# Patient Record
Sex: FEMALE | Race: WHITE | NOT HISPANIC OR LATINO | URBAN - METROPOLITAN AREA
[De-identification: names, ages, dates, MRNs, and addresses within clinical notes are randomized per-mention and may not be internally consistent; named-entity substitution may affect disease eponyms.]

---

## 2020-09-08 ENCOUNTER — OFFICE (OUTPATIENT)
Dept: URBAN - METROPOLITAN AREA CLINIC 64 | Facility: CLINIC | Age: 35
End: 2020-09-08

## 2020-09-08 VITALS
HEART RATE: 78 BPM | HEIGHT: 67 IN | DIASTOLIC BLOOD PRESSURE: 86 MMHG | SYSTOLIC BLOOD PRESSURE: 131 MMHG | WEIGHT: 169 LBS

## 2020-09-08 DIAGNOSIS — K64.5 PERIANAL VENOUS THROMBOSIS: ICD-10-CM

## 2020-09-08 DIAGNOSIS — K62.5 HEMORRHAGE OF ANUS AND RECTUM: ICD-10-CM

## 2020-09-08 PROCEDURE — 99203 OFFICE O/P NEW LOW 30 MIN: CPT | Performed by: NURSE PRACTITIONER

## 2020-09-09 ENCOUNTER — OFFICE VISIT (OUTPATIENT)
Dept: SURGERY | Facility: CLINIC | Age: 35
End: 2020-09-09

## 2020-09-09 VITALS
BODY MASS INDEX: 26.53 KG/M2 | TEMPERATURE: 97.4 F | HEART RATE: 102 BPM | DIASTOLIC BLOOD PRESSURE: 73 MMHG | OXYGEN SATURATION: 98 % | HEIGHT: 67 IN | WEIGHT: 169 LBS | SYSTOLIC BLOOD PRESSURE: 106 MMHG

## 2020-09-09 DIAGNOSIS — K64.5 THROMBOSED HEMORRHOIDS: Primary | ICD-10-CM

## 2020-09-09 PROBLEM — M25.562 KNEE PAIN, LEFT: Status: ACTIVE | Noted: 2017-12-26

## 2020-09-09 PROBLEM — F17.200 SMOKER: Status: ACTIVE | Noted: 2017-01-03

## 2020-09-09 PROBLEM — R01.1 MURMUR: Status: ACTIVE | Noted: 2020-09-09

## 2020-09-09 PROCEDURE — 46083 INC THROMBOSED HROID XTRNL: CPT | Performed by: SURGERY

## 2020-09-09 PROCEDURE — 99214 OFFICE O/P EST MOD 30 MIN: CPT | Performed by: SURGERY

## 2020-09-09 RX ORDER — DEXTROAMPHETAMINE SACCHARATE, AMPHETAMINE ASPARTATE, DEXTROAMPHETAMINE SULFATE AND AMPHETAMINE SULFATE 2.5; 2.5; 2.5; 2.5 MG/1; MG/1; MG/1; MG/1
20 TABLET ORAL DAILY
COMMUNITY
Start: 2020-09-01 | End: 2022-01-13

## 2020-09-09 RX ORDER — VARENICLINE TARTRATE 1 MG/1
1 TABLET, FILM COATED ORAL 2 TIMES DAILY
COMMUNITY
End: 2022-12-13

## 2020-09-09 RX ORDER — HYDROCODONE BITARTRATE AND ACETAMINOPHEN 7.5; 325 MG/1; MG/1
1 TABLET ORAL 4 TIMES DAILY PRN
COMMUNITY
Start: 2020-09-01

## 2020-09-09 RX ORDER — DEXTROAMPHETAMINE SULFATE, DEXTROAMPHETAMINE SACCHARATE, AMPHETAMINE SULFATE AND AMPHETAMINE ASPARTATE 5; 5; 5; 5 MG/1; MG/1; MG/1; MG/1
20 CAPSULE, EXTENDED RELEASE ORAL EVERY MORNING
COMMUNITY
Start: 2020-09-01 | End: 2021-11-29

## 2020-09-09 RX ORDER — ONDANSETRON 4 MG/1
4 TABLET, FILM COATED ORAL DAILY PRN
COMMUNITY
Start: 2020-09-02

## 2020-09-09 NOTE — PROGRESS NOTES
GENERAL SURGERY CONSULTATION NOTE    Consult requested by: ANSON Campbell    Patient Care Team:  Trell Pal MD as PCP - General    Reason for consult: Thrombosed external hemorrhoid    Subjective     Patient is a 35 y.o. female presents with a thrombosed external hemorrhoid after being seen by gastroenterology yesterday.  Patient states that she has had hemorrhoids since she had her first child over 13 years ago.  Occasionally they will flare, which point she is able to use Preparation H suppositories in the pain and swelling get better.  About 3 weeks ago she developed a lump in her anal canal which was exceptionally painful.  She began applying Preparation H to the area and performing Epsom salt sitz bath's which did not alleviate her symptoms.  Yesterday she was seen by her gastroenterologist who prescribed too bad ointment which she has been taking but it has not made much of a difference.  She was noted to have a thrombosed external hemorrhoid and referred to me for management.  Patient reports that she usually is pretty regular having a bowel movement every morning.  She states that her bowel movements are solid, but she does strain occasionally.  With the passage of bowel movements she does have some anal pain, as well as bleeding with wiping.  When she is not having bowel movements she does have occasional burning secondary to her hemorrhoids.    Review of Systems   Constitutional: Negative for appetite change, chills and fever.   HENT: Negative for congestion and sore throat.    Respiratory: Negative for cough and shortness of breath.    Cardiovascular: Negative for chest pain and palpitations.   Gastrointestinal: Positive for anal bleeding, constipation and rectal pain. Negative for abdominal pain, diarrhea, nausea, vomiting and GERD.   Genitourinary: Negative for difficulty urinating, dysuria and frequency.   Musculoskeletal: Negative for arthralgias and back pain.   Skin: Negative for rash and skin  lesions.   Neurological: Negative for dizziness, seizures and memory problem.   Hematological: Negative for adenopathy. Does not bruise/bleed easily.   Psychiatric/Behavioral: Negative for sleep disturbance and depressed mood.        History  Past Medical History:   Diagnosis Date   • Hemorrhoid      Past Surgical History:   Procedure Laterality Date   •  SECTION     •  SECTION       History reviewed. No pertinent family history.  Social History     Tobacco Use   • Smoking status: Current Every Day Smoker   • Smokeless tobacco: Never Used   Substance Use Topics   • Alcohol use: Yes   • Drug use: Never       (Not in a hospital admission)  Allergies:  Adhesive tape    Objective     Vital Signs  Temp:  [97.4 °F (36.3 °C)] 97.4 °F (36.3 °C)  Heart Rate:  [102] 102  BP: (106)/(73) 106/73    Physical Exam   Constitutional: She is oriented to person, place, and time. She appears well-developed and well-nourished.   HENT:   Head: Normocephalic and atraumatic.   Eyes: Pupils are equal, round, and reactive to light.   Neck: Normal range of motion.   Cardiovascular: Normal rate and regular rhythm.   Pulmonary/Chest: Effort normal and breath sounds normal.   Abdominal: Soft. She exhibits no distension. There is no tenderness. No hernia.   Genitourinary:   Genitourinary Comments: Patient has a large external hemorrhoid in the right posterior position.  It is swollen, tender, and tense.  Internal digital rectal exam revealed no masses, bleeding, or significant internal hemorrhoids.  Patient does have other external hemorrhoids which are enlarged, but do not appear to be thrombosed at this time.   Musculoskeletal: Normal range of motion. She exhibits no edema.   Lymphadenopathy:     She has no cervical adenopathy.     She has no axillary adenopathy.   Neurological: She is alert and oriented to person, place, and time.   Skin: Skin is warm and dry. No rash noted.   Psychiatric: She has a normal mood and  affect.   Vitals reviewed.      Results Review:   Lab Results (last 24 hours)     ** No results found for the last 24 hours. **        No radiology results for the last day      I reviewed the patient's new imaging results and agree with the interpretation.  I reviewed the patient's other test results and agree with the interpretation    Assessment/Plan     Active Problems:  Thrombosed external hemorrhoid  Hemorrhoids    We reviewed the natural history of hemorrhoids, as well as medical and surgical management options available for them.    In order to give her immediate relief with regards to her thrombosed external hemorrhoid, I recommend incision and drainage at this time.  The risk, benefits, and alternatives to this were discussed the patient agreed to proceed with allowing me to perform this procedure at the bedside within the office.  For description of procedure, please see other note.    The first goal is to improve bowel habits by increasing fiber in the diet daily.  I recommend taking 2 scoops of fiber (either Metamucil or Benefiber) nightly dissolved in 8 ounces of water.  This will bulk and soften stools and make them easier to pass.    I then recommend drinking between 64 and 100 ounces of water per day.  We discussed that if the fiber is increased without increasing water, this may actually exacerbate constipation and allow for firm bowel movements.    I recommend performing sitz bath's 3 times daily and as needed after bowel movements.    We discussed using a product such as Anusol and Preparation H for short periods of time to treat exacerbations.    The patient will attempt these maneuvers in an attempt to optimally medically manage her hemorrhoids.  If this is unsuccessful, in a month we will revisit and discuss surgical approach for hemorrhoids.    I discussed the patients findings and my recommendations with the patient who agrees.     Trell Jeffery MD  09/09/20  13:44

## 2020-09-09 NOTE — PROGRESS NOTES
Operative Note    Patient Name:  Antonia Estrella  YOB: 1985    Date of Surgery: 9/9/2020       Indications:  See consult note. Thrombosed external hemorrhoid    Pre-op Diagnosis:   Thrombosed external hemorrhoid    Post-op Diagnosis:  Post-Op Diagnosis Codes:  Same    Procedure/CPT® Codes: Incision and drainage of thrombosed external hemorrhoid    Staff:  MD Jose D-surgeon    Anesthesia: 1% lidocaine with epinephrine    Estimated Blood Loss: minimal    Implants:    none    Specimen:          None    Findings: Blood clot within the thrombosed external hemorrhoid    Complications: None, immediately    Description of Procedure: After obtaining verbal consent, the patient was placed in the fetal position.  Her anus was then prepped and draped in the usual sterile fashion using Betadine.  I then injected local anesthesia into her thrombosed external hemorrhoid and made a linear incision using an 11 blade along the hemorrhoid in a radial fashion.  Once this was done I was able to express a large amount of coagulated and liquefied blood from the cavity of the thrombosed external hemorrhoid.  At the conclusion of this the hemorrhoid was much smaller, and no longer tender.  Patient seemed to have almost immediate relief with her pain at that time.  Hemostasis was verified and the patient was allowed to leave under her own power.  She tolerated the procedure well.    Trell Jeffery MD     Date: 9/9/2020  Time: 13:49

## 2020-10-26 ENCOUNTER — OFFICE VISIT (OUTPATIENT)
Dept: SURGERY | Facility: CLINIC | Age: 35
End: 2020-10-26

## 2020-10-26 VITALS
RESPIRATION RATE: 16 BRPM | HEART RATE: 85 BPM | TEMPERATURE: 97.1 F | HEIGHT: 67 IN | WEIGHT: 169.4 LBS | SYSTOLIC BLOOD PRESSURE: 113 MMHG | BODY MASS INDEX: 26.59 KG/M2 | DIASTOLIC BLOOD PRESSURE: 77 MMHG | OXYGEN SATURATION: 100 %

## 2020-10-26 DIAGNOSIS — K64.4 RESIDUAL HEMORRHOIDAL SKIN TAGS: Primary | ICD-10-CM

## 2020-10-26 PROCEDURE — 99213 OFFICE O/P EST LOW 20 MIN: CPT | Performed by: SURGERY

## 2020-10-26 NOTE — PROGRESS NOTES
GENERAL SURGERY ESTABLISHED PATIENT NOTE    Consult requested by: ANSON Campbell    Patient Care Team:  Trell Pal MD as PCP - General    Reason for consult: Thrombosed external hemorrhoid    Subjective   From 9/9/2020:  Patient is a 35 y.o. female presents with a thrombosed external hemorrhoid after being seen by gastroenterology yesterday.  Patient states that she has had hemorrhoids since she had her first child over 13 years ago.  Occasionally they will flare, which point she is able to use Preparation H suppositories in the pain and swelling get better.  About 3 weeks ago she developed a lump in her anal canal which was exceptionally painful.  She began applying Preparation H to the area and performing Epsom salt sitz bath's which did not alleviate her symptoms.  Yesterday she was seen by her gastroenterologist who prescribed too bad ointment which she has been taking but it has not made much of a difference.  She was noted to have a thrombosed external hemorrhoid and referred to me for management.  Patient reports that she usually is pretty regular having a bowel movement every morning.  She states that her bowel movements are solid, but she does strain occasionally.  With the passage of bowel movements she does have some anal pain, as well as bleeding with wiping.  When she is not having bowel movements she does have occasional burning secondary to her hemorrhoids.    From today:  The patient reports improvement in her overall condition and is no longer having any bleeding.  The rectal pain which she was once experiencing with the thrombosed external hemorrhoid has resolved.  She did state that she had some discomfort for 3 to 4 days following incision and drainage which was a sharp stinging pain.  Since that time she has attempted to increase her fiber intake, and has increased her overall water intake.  She is no longer drinking sodas but now drinks tea and lemonade and water primarily.  She does state  that her bowel movements are still erratic often alternating between diarrhea and constipation.  She notices that when her bowel movements are irregular she tends to have discomfort.    Review of Systems   Constitutional: Negative for appetite change, chills and fever.   HENT: Negative for congestion and sore throat.    Respiratory: Negative for cough and shortness of breath.    Cardiovascular: Negative for chest pain and palpitations.   Gastrointestinal: Positive for constipation and diarrhea. Negative for abdominal pain, anal bleeding, nausea, rectal pain, vomiting and GERD.   Genitourinary: Negative for difficulty urinating, dysuria and frequency.   Musculoskeletal: Negative for arthralgias and back pain.   Skin: Negative for rash and skin lesions.   Neurological: Negative for dizziness, seizures and memory problem.   Hematological: Negative for adenopathy. Does not bruise/bleed easily.   Psychiatric/Behavioral: Negative for sleep disturbance and depressed mood.        History  Past Medical History:   Diagnosis Date   • Hemorrhoid      Past Surgical History:   Procedure Laterality Date   •  SECTION     •  SECTION       No family history on file.  Social History     Tobacco Use   • Smoking status: Current Every Day Smoker   • Smokeless tobacco: Never Used   Substance Use Topics   • Alcohol use: Yes   • Drug use: Never     (Not in a hospital admission)    Allergies:  Adhesive tape    Objective     Vital Signs  Temp:  [97.1 °F (36.2 °C)] 97.1 °F (36.2 °C)  Heart Rate:  [85] 85  Resp:  [16] 16  BP: (113)/(77) 113/77    Physical Exam   Constitutional: She is oriented to person, place, and time. She appears well-developed.   HENT:   Head: Normocephalic and atraumatic.   Eyes: Pupils are equal, round, and reactive to light.   Neck: Normal range of motion.   Cardiovascular: Normal rate and regular rhythm.   Pulmonary/Chest: Effort normal and breath sounds normal.   Abdominal: Soft. She exhibits no  distension. There is no abdominal tenderness. No hernia.   Genitourinary:    Genitourinary Comments: The patient does have bulky residual external hemorrhoidal skin tags which are not inflamed and do not demonstrate any evidence of ongoing thrombosis.  Internal digital rectal exam performed, no masses, no evidence of bleeding, and small amount of stool in the rectal vault.     Musculoskeletal: Normal range of motion.   Lymphadenopathy:     She has no cervical adenopathy.   Neurological: She is alert and oriented to person, place, and time.   Skin: Skin is warm and dry. No rash noted.   Vitals reviewed.      Results Review:   Lab Results (last 24 hours)     ** No results found for the last 24 hours. **        No radiology results for the last day      I reviewed the patient's new imaging results and agree with the interpretation.  I reviewed the patient's other test results and agree with the interpretation    Assessment/Plan     Active Problems:  History of thrombosed external hemorrhoid  Hemorrhoids    Discussed with patient that she seems to be doing well from the standpoint of her external hemorrhoids.  There is no evidence of any ongoing thrombosis.  Her symptoms have been well regulated with increase in water intake and fiber intake.  With regards to surgical excision, she does have bulky external hemorrhoidal skin tags which could be removed.  We discussed that surgery for this would be painful as these lie external to the dentate line, and the patient does not want to undergo such a painful operation.  She will continue to increase her water and fiber intake as well as performing sitz bath's.  She will continue to use a bidet in order to maintain cleanliness of her anal area.  Should the patient wish to undergo surgery in the future or have symptoms of recurrent thrombosed external hemorrhoids she states that she will return to my office and discuss this with me further.  Follow-up with me as needed    I  discussed the patients findings and my recommendations with the patient who agrees.     Trell Jeffery MD  10/26/20  13:00 EDT

## 2020-11-19 ENCOUNTER — LAB (OUTPATIENT)
Dept: LAB | Facility: HOSPITAL | Age: 35
End: 2020-11-19

## 2020-11-19 ENCOUNTER — TRANSCRIBE ORDERS (OUTPATIENT)
Dept: ADMINISTRATIVE | Facility: HOSPITAL | Age: 35
End: 2020-11-19

## 2020-11-19 DIAGNOSIS — Z01.818 PREOP EXAMINATION: ICD-10-CM

## 2020-11-19 DIAGNOSIS — Z01.818 PREOP EXAMINATION: Primary | ICD-10-CM

## 2020-11-19 LAB
ALBUMIN SERPL-MCNC: 4.4 G/DL (ref 3.5–5.2)
ANION GAP SERPL CALCULATED.3IONS-SCNC: 12.6 MMOL/L (ref 5–15)
BASOPHILS # BLD AUTO: 0.03 10*3/MM3 (ref 0–0.2)
BASOPHILS NFR BLD AUTO: 0.4 % (ref 0–1.5)
BUN SERPL-MCNC: 8 MG/DL (ref 6–20)
BUN/CREAT SERPL: 10.4 (ref 7–25)
CALCIUM SPEC-SCNC: 9 MG/DL (ref 8.6–10.5)
CHLORIDE SERPL-SCNC: 104 MMOL/L (ref 98–107)
CO2 SERPL-SCNC: 24.4 MMOL/L (ref 22–29)
CREAT SERPL-MCNC: 0.77 MG/DL (ref 0.57–1)
DEPRECATED RDW RBC AUTO: 40.6 FL (ref 37–54)
EOSINOPHIL # BLD AUTO: 0.05 10*3/MM3 (ref 0–0.4)
EOSINOPHIL NFR BLD AUTO: 0.6 % (ref 0.3–6.2)
ERYTHROCYTE [DISTWIDTH] IN BLOOD BY AUTOMATED COUNT: 12 % (ref 12.3–15.4)
GFR SERPL CREATININE-BSD FRML MDRD: 84 ML/MIN/1.73
GLUCOSE SERPL-MCNC: 144 MG/DL (ref 65–99)
HBA1C MFR BLD: 5.4 % (ref 3.5–5.6)
HCT VFR BLD AUTO: 42.3 % (ref 34–46.6)
HGB BLD-MCNC: 14.3 G/DL (ref 12–15.9)
IMM GRANULOCYTES # BLD AUTO: 0.04 10*3/MM3 (ref 0–0.05)
IMM GRANULOCYTES NFR BLD AUTO: 0.5 % (ref 0–0.5)
LYMPHOCYTES # BLD AUTO: 2.2 10*3/MM3 (ref 0.7–3.1)
LYMPHOCYTES NFR BLD AUTO: 27.8 % (ref 19.6–45.3)
MCH RBC QN AUTO: 31.4 PG (ref 26.6–33)
MCHC RBC AUTO-ENTMCNC: 33.8 G/DL (ref 31.5–35.7)
MCV RBC AUTO: 93 FL (ref 79–97)
MONOCYTES # BLD AUTO: 0.5 10*3/MM3 (ref 0.1–0.9)
MONOCYTES NFR BLD AUTO: 6.3 % (ref 5–12)
NEUTROPHILS NFR BLD AUTO: 5.1 10*3/MM3 (ref 1.7–7)
NEUTROPHILS NFR BLD AUTO: 64.4 % (ref 42.7–76)
NRBC BLD AUTO-RTO: 0 /100 WBC (ref 0–0.2)
PLATELET # BLD AUTO: 356 10*3/MM3 (ref 140–450)
PMV BLD AUTO: 9.8 FL (ref 6–12)
POTASSIUM SERPL-SCNC: 4.3 MMOL/L (ref 3.5–5.2)
RBC # BLD AUTO: 4.55 10*6/MM3 (ref 3.77–5.28)
SODIUM SERPL-SCNC: 141 MMOL/L (ref 136–145)
WBC # BLD AUTO: 7.92 10*3/MM3 (ref 3.4–10.8)

## 2020-11-19 PROCEDURE — C9803 HOPD COVID-19 SPEC COLLECT: HCPCS

## 2020-11-19 PROCEDURE — U0004 COV-19 TEST NON-CDC HGH THRU: HCPCS

## 2020-11-19 PROCEDURE — 85025 COMPLETE CBC W/AUTO DIFF WBC: CPT

## 2020-11-19 PROCEDURE — 36415 COLL VENOUS BLD VENIPUNCTURE: CPT

## 2020-11-19 PROCEDURE — 82040 ASSAY OF SERUM ALBUMIN: CPT

## 2020-11-19 PROCEDURE — 83036 HEMOGLOBIN GLYCOSYLATED A1C: CPT

## 2020-11-19 PROCEDURE — 80048 BASIC METABOLIC PNL TOTAL CA: CPT

## 2020-11-20 LAB — SARS-COV-2 RNA RESP QL NAA+PROBE: NOT DETECTED

## 2021-02-01 ENCOUNTER — HOSPITAL ENCOUNTER (OUTPATIENT)
Dept: CARDIOLOGY | Facility: HOSPITAL | Age: 36
Discharge: HOME | End: 2021-02-01

## 2021-02-01 ENCOUNTER — TRANSCRIBE ORDERS (OUTPATIENT)
Dept: ADMINISTRATIVE | Facility: HOSPITAL | Age: 36
End: 2021-02-01

## 2021-02-01 ENCOUNTER — LAB (OUTPATIENT)
Dept: LAB | Facility: HOSPITAL | Age: 36
End: 2021-02-01

## 2021-02-01 DIAGNOSIS — Z01.818 OTHER SPECIFIED PRE-OPERATIVE EXAMINATION: Primary | ICD-10-CM

## 2021-02-01 DIAGNOSIS — Z01.818 OTHER SPECIFIED PRE-OPERATIVE EXAMINATION: ICD-10-CM

## 2021-02-01 LAB
ANION GAP SERPL CALCULATED.3IONS-SCNC: 8.5 MMOL/L (ref 5–15)
BASOPHILS # BLD AUTO: 0.04 10*3/MM3 (ref 0–0.2)
BASOPHILS NFR BLD AUTO: 0.5 % (ref 0–1.5)
BUN SERPL-MCNC: 5 MG/DL (ref 6–20)
BUN/CREAT SERPL: 7 (ref 7–25)
CALCIUM SPEC-SCNC: 9.1 MG/DL (ref 8.6–10.5)
CHLORIDE SERPL-SCNC: 104 MMOL/L (ref 98–107)
CO2 SERPL-SCNC: 25.5 MMOL/L (ref 22–29)
CREAT SERPL-MCNC: 0.71 MG/DL (ref 0.57–1)
DEPRECATED RDW RBC AUTO: 43.5 FL (ref 37–54)
EOSINOPHIL # BLD AUTO: 0.1 10*3/MM3 (ref 0–0.4)
EOSINOPHIL NFR BLD AUTO: 1.3 % (ref 0.3–6.2)
ERYTHROCYTE [DISTWIDTH] IN BLOOD BY AUTOMATED COUNT: 12.4 % (ref 12.3–15.4)
GFR SERPL CREATININE-BSD FRML MDRD: 93 ML/MIN/1.73
GLUCOSE SERPL-MCNC: 116 MG/DL (ref 65–99)
HBA1C MFR BLD: 5.4 % (ref 3.5–5.6)
HCT VFR BLD AUTO: 44 % (ref 34–46.6)
HGB BLD-MCNC: 14.7 G/DL (ref 12–15.9)
IMM GRANULOCYTES # BLD AUTO: 0.02 10*3/MM3 (ref 0–0.05)
IMM GRANULOCYTES NFR BLD AUTO: 0.3 % (ref 0–0.5)
LYMPHOCYTES # BLD AUTO: 2.35 10*3/MM3 (ref 0.7–3.1)
LYMPHOCYTES NFR BLD AUTO: 30.1 % (ref 19.6–45.3)
MCH RBC QN AUTO: 32 PG (ref 26.6–33)
MCHC RBC AUTO-ENTMCNC: 33.4 G/DL (ref 31.5–35.7)
MCV RBC AUTO: 95.9 FL (ref 79–97)
MONOCYTES # BLD AUTO: 0.49 10*3/MM3 (ref 0.1–0.9)
MONOCYTES NFR BLD AUTO: 6.3 % (ref 5–12)
MRSA DNA SPEC QL NAA+PROBE: NORMAL
NEUTROPHILS NFR BLD AUTO: 4.81 10*3/MM3 (ref 1.7–7)
NEUTROPHILS NFR BLD AUTO: 61.5 % (ref 42.7–76)
NRBC BLD AUTO-RTO: 0 /100 WBC (ref 0–0.2)
PLATELET # BLD AUTO: 345 10*3/MM3 (ref 140–450)
PMV BLD AUTO: 9.8 FL (ref 6–12)
POTASSIUM SERPL-SCNC: 4.1 MMOL/L (ref 3.5–5.2)
RBC # BLD AUTO: 4.59 10*6/MM3 (ref 3.77–5.28)
SODIUM SERPL-SCNC: 138 MMOL/L (ref 136–145)
WBC # BLD AUTO: 7.81 10*3/MM3 (ref 3.4–10.8)

## 2021-02-01 PROCEDURE — 85025 COMPLETE CBC W/AUTO DIFF WBC: CPT

## 2021-02-01 PROCEDURE — 87641 MR-STAPH DNA AMP PROBE: CPT

## 2021-02-01 PROCEDURE — 93005 ELECTROCARDIOGRAM TRACING: CPT

## 2021-02-01 PROCEDURE — 83036 HEMOGLOBIN GLYCOSYLATED A1C: CPT

## 2021-02-01 PROCEDURE — 80048 BASIC METABOLIC PNL TOTAL CA: CPT

## 2021-02-01 PROCEDURE — 93010 ELECTROCARDIOGRAM REPORT: CPT | Performed by: INTERNAL MEDICINE

## 2021-02-01 PROCEDURE — 36415 COLL VENOUS BLD VENIPUNCTURE: CPT

## 2021-02-04 ENCOUNTER — LAB (OUTPATIENT)
Dept: LAB | Facility: HOSPITAL | Age: 36
End: 2021-02-04

## 2021-02-04 DIAGNOSIS — Z01.818 OTHER SPECIFIED PRE-OPERATIVE EXAMINATION: ICD-10-CM

## 2021-02-04 LAB — SARS-COV-2 ORF1AB RESP QL NAA+PROBE: NOT DETECTED

## 2021-02-04 PROCEDURE — U0004 COV-19 TEST NON-CDC HGH THRU: HCPCS

## 2021-02-04 PROCEDURE — C9803 HOPD COVID-19 SPEC COLLECT: HCPCS

## 2021-02-12 LAB — QT INTERVAL: 381 MS

## 2021-10-20 ENCOUNTER — APPOINTMENT (OUTPATIENT)
Dept: GENERAL RADIOLOGY | Facility: HOSPITAL | Age: 36
End: 2021-10-20

## 2021-10-20 ENCOUNTER — HOSPITAL ENCOUNTER (EMERGENCY)
Facility: HOSPITAL | Age: 36
Discharge: HOME | End: 2021-10-20
Admitting: EMERGENCY MEDICINE

## 2021-10-20 VITALS
HEART RATE: 83 BPM | WEIGHT: 170 LBS | OXYGEN SATURATION: 99 % | DIASTOLIC BLOOD PRESSURE: 75 MMHG | BODY MASS INDEX: 26.68 KG/M2 | HEIGHT: 67 IN | RESPIRATION RATE: 16 BRPM | TEMPERATURE: 97.5 F | SYSTOLIC BLOOD PRESSURE: 125 MMHG

## 2021-10-20 DIAGNOSIS — M79.672 LEFT FOOT PAIN: Primary | ICD-10-CM

## 2021-10-20 DIAGNOSIS — M25.572 LEFT ANKLE PAIN, UNSPECIFIED CHRONICITY: ICD-10-CM

## 2021-10-20 DIAGNOSIS — M25.562 LEFT KNEE PAIN, UNSPECIFIED CHRONICITY: ICD-10-CM

## 2021-10-20 PROCEDURE — 73630 X-RAY EXAM OF FOOT: CPT

## 2021-10-20 PROCEDURE — 73562 X-RAY EXAM OF KNEE 3: CPT

## 2021-10-20 PROCEDURE — 99283 EMERGENCY DEPT VISIT LOW MDM: CPT

## 2021-10-20 PROCEDURE — 73610 X-RAY EXAM OF ANKLE: CPT

## 2021-10-20 NOTE — ED NOTES
Pt slipped off porch last night, c/o left foot/ankle pain, right foot/ankle pain. Abrasions to right foot and bilateral knees.      Susan Ortiz RN  10/20/21 9031

## 2021-10-20 NOTE — DISCHARGE INSTRUCTIONS
Wear Ace bandage or left ankle boot at home as needed for comfort and stability.  Rest, ice elevate left ankle for additional pain relief  Use crutches to help with ambulation, decreased weightbearing for the next 2 to 3 days and increase as tolerated    Take your Norco as prescribed at home as needed for pain    Follow-up with orthopedic specialist for further evaluation and treatment as needed    Follow-up with PCP for evaluation management    Return to the ER for new or worsening symptoms

## 2021-10-20 NOTE — ED PROVIDER NOTES
Subjective   Chief Complaint: Ankle pain    Patient is a 36-year-old  female presents the ER with complaints of left ankle and foot pain and left knee pain after falling yesterday.  Patient states that she stepped off her porch and rolled her ankle and heard a crack.  Patient states she used an Ace bandage, ice, elevation last night but upon ambulating this morning the pain was extensive.  She states there was no bruising but there was moderate swelling that went down after application of bandage.  Patient states some moderate discomfort in the left knee and right ankle from when she fell.  Patient rates her left ankle pain a constant sharp, stabbing 8/10.  Patient took Lortab 7.5 mg at 8 AM this morning.  Patient admits to some numbness upon palpation of the ankle.  Patient denies any fever, chills, chest pain, shortness of breath.    Location: Left ankle    Quality: Sharp, stabbing    Duration: 1 day    Timing: Constant    Severity: Moderate    Associated Symptoms: Numbness with palpation    PCP: Trell Pal      History provided by:  Patient      Review of Systems   Constitutional: Negative for chills and fever.   HENT: Negative for sore throat and trouble swallowing.    Respiratory: Negative for shortness of breath and wheezing.    Cardiovascular: Negative for chest pain.   Gastrointestinal: Negative for abdominal pain.   Genitourinary: Negative for dysuria.   Musculoskeletal: Positive for arthralgias.        Right ankle pain, left foot pain, left ankle pain, left knee pain   Skin: Negative for color change, rash and wound.   Neurological: Negative for headaches.   Psychiatric/Behavioral: Negative for behavioral problems.   All other systems reviewed and are negative.      Past Medical History:   Diagnosis Date   • Hemorrhoid        Allergies   Allergen Reactions   • Adhesive Tape Rash     Liquid adhesive -        Past Surgical History:   Procedure Laterality Date   •  SECTION     •   SECTION  2013       History reviewed. No pertinent family history.    Social History     Socioeconomic History   • Marital status:    Tobacco Use   • Smoking status: Current Every Day Smoker   • Smokeless tobacco: Never Used   Substance and Sexual Activity   • Alcohol use: Yes   • Drug use: Never   • Sexual activity: Defer           Objective   Physical Exam  Vitals and nursing note reviewed.   Constitutional:       Appearance: Normal appearance. She is well-developed and normal weight. She is not ill-appearing or toxic-appearing.   HENT:      Head: Normocephalic and atraumatic.      Nose: Nose normal.   Eyes:      Pupils: Pupils are equal, round, and reactive to light.   Cardiovascular:      Rate and Rhythm: Normal rate and regular rhythm.      Pulses: Normal pulses.      Heart sounds: Normal heart sounds. No murmur heard.      Pulmonary:      Effort: Pulmonary effort is normal. No respiratory distress.      Breath sounds: Normal breath sounds. No wheezing.   Abdominal:      General: Bowel sounds are normal. There is no distension.      Palpations: Abdomen is soft.      Tenderness: There is no abdominal tenderness.   Musculoskeletal:         General: Tenderness present. No deformity. Normal range of motion.      Cervical back: Normal range of motion.      Right lower leg: No edema.      Left lower leg: No edema.      Comments: Tenderness to palpation medial and lateral malleolus left ankle and over the dorsal aspect of the left foot.  Tenderness across anterior aspect patella left knee  Pedal pulses present 2+ bilaterally, sensation status intact, motor strength 5/5 bilaterally.    Homans' sign negative   Skin:     General: Skin is warm and dry.      Capillary Refill: Capillary refill takes less than 2 seconds.      Findings: No rash.   Neurological:      General: No focal deficit present.      Mental Status: She is alert and oriented to person, place, and time.      Cranial Nerves: No cranial nerve deficit.  "     Motor: No weakness.   Psychiatric:         Mood and Affect: Mood normal.         Behavior: Behavior normal.         Procedures           ED Course    /75 (BP Location: Right arm, Patient Position: Sitting)   Pulse 83   Temp 97.5 °F (36.4 °C) (Oral)   Resp 16   Ht 170.2 cm (67\")   Wt 77.1 kg (170 lb)   SpO2 99%   BMI 26.63 kg/m²   Labs Reviewed - No data to display  Medications - No data to display  XR Knee 3 View Left    Result Date: 10/20/2021  1.No evidence for displaced fracture or dislocation.  Electronically Signed By-Casa Spain MD On:10/20/2021 11:14 AM This report was finalized on [id number] by  Casa Spain MD.    XR Foot 3+ View Left    Result Date: 10/20/2021  1.No evidence for displaced fracture or dislocation.  Electronically Signed By-Casa Spain MD On:10/20/2021 11:13 AM This report was finalized on [id number] by  Casa Spain MD.    XR Ankle 3+ View Bilateral    Result Date: 10/20/2021  1.No displaced fracture or dislocation. The ankle mortise remains intact.  Electronically Signed By-Casa Spain MD On:10/20/2021 11:07 AM This report was finalized on [id number] by  Casa Spain MD.                                           MDM  Number of Diagnoses or Management Options  Left ankle pain, unspecified chronicity  Left foot pain  Left knee pain, unspecified chronicity  Diagnosis management comments: MEDICAL DECISION  Epic Chart Review: No recent admission  Comorbidities: Chronic left ankle pain  Differentials: Fracture contusion, sprain, strain, dislocation; this list is not all inclusive and does not constitute the entirety of considered causes  Radiology interpretation:  Images reviewed by me and interpreted by radiologist, as above  Lab interpretation: Not warranted    While in the ED appropriate PPE was worn during exam and throughout all encounters with the patient.  Patient had the above evaluation.  Patient states she took Norco about 2 hours prior to " ER arrival.  X-ray imaging of left ankle, foot, left knee and right ankle negative for acute osseous abnormality.  Findings discussed with patient at bedside, patient states that she has Ace bandages and cam walker boot at home that she would prefer to use and crutches.  Patient initially states that she does not have enough pain medication at home to control her pain.  Inspect reviewed, patient receives Norco regularly per her PCP, quantity 120 that she filled 9/30/2021.  Patient states that she normally takes 2 Norco per day, and advised patient that she may take 1 or 2 extra per day over the next few days for increased pain.  This conversation was witnessed by ER RN, Susan Ortiz.  Patient to follow-up with orthopedic specialist as well for further evaluation and treatment as needed.  Patient counseled on RICE therapy.    Discharge plan and instructions were discussed with the patient who verbalized understanding and is in agreement with the plan, all questions were answered at this time.  Pt is aware that discharge does not mean that nothing is wrong but it indicates no emergency is present and they must continue care with follow-up as given below or physician of their choice.    Patient is aware of signs symptoms that would require immediate return to the emergency room.  Patient understands importance of following up with primary care provider for further evaluation and worsening concerns as well as blood pressure recheck in the next 4 weeks.    Patient was discharged in improved stable condition.         Amount and/or Complexity of Data Reviewed  Tests in the radiology section of CPT®: reviewed and ordered    Patient Progress  Patient progress: stable      Final diagnoses:   Left foot pain   Left ankle pain, unspecified chronicity   Left knee pain, unspecified chronicity       ED Disposition  ED Disposition     ED Disposition Condition Comment    Discharge Stable           Trell Pal MD  [address]  [place] [address] [phone]    Schedule an appointment as soon as possible for a visit in 2 days  As needed, If symptoms worsen    Billy Valentine MD  [facility] [facility] [address]  [place] [address] [phone]    Schedule an appointment as soon as possible for a visit in 2 days  As needed, If symptoms worsen         Medication List      No changes were made to your prescriptions during this visit.          Fanny Hall PA  10/20/21 123

## 2021-10-25 ENCOUNTER — OFFICE VISIT (OUTPATIENT)
Dept: PODIATRY | Facility: CLINIC | Age: 36
End: 2021-10-25

## 2021-10-25 VITALS
HEIGHT: 67 IN | HEART RATE: 102 BPM | BODY MASS INDEX: 26.68 KG/M2 | WEIGHT: 170 LBS | SYSTOLIC BLOOD PRESSURE: 141 MMHG | DIASTOLIC BLOOD PRESSURE: 85 MMHG

## 2021-10-25 DIAGNOSIS — G89.29 CHRONIC ANKLE PAIN, BILATERAL: Primary | ICD-10-CM

## 2021-10-25 DIAGNOSIS — M25.571 CHRONIC ANKLE PAIN, BILATERAL: Primary | ICD-10-CM

## 2021-10-25 DIAGNOSIS — M25.572 CHRONIC ANKLE PAIN, BILATERAL: Primary | ICD-10-CM

## 2021-10-25 DIAGNOSIS — M25.372 ANKLE INSTABILITY, LEFT: ICD-10-CM

## 2021-10-25 DIAGNOSIS — M25.371 ANKLE INSTABILITY, RIGHT: ICD-10-CM

## 2021-10-25 PROCEDURE — 99203 OFFICE O/P NEW LOW 30 MIN: CPT | Performed by: PODIATRIST

## 2021-10-25 RX ORDER — DEXTROAMPHETAMINE SACCHARATE, AMPHETAMINE ASPARTATE, DEXTROAMPHETAMINE SULFATE AND AMPHETAMINE SULFATE 5; 5; 5; 5 MG/1; MG/1; MG/1; MG/1
20 TABLET ORAL DAILY
COMMUNITY
Start: 2021-09-30 | End: 2021-11-05 | Stop reason: HOSPADM

## 2021-10-26 NOTE — PROGRESS NOTES
10/25/2021  Foot and Ankle Surgery - New Patient   Provider: Dr. Melvin Pineda DPM  Location: [facility]    Subjective:  Antonia Estrella is a 36 y.o. female.     Chief Complaint   Patient presents with   • Left Ankle - Pain   • Right Ankle - Pain   • Initial Evaluation     last pcp 2021       HPI: Patient is a 36-year-old female that presents with bilateral ankle pain.  She states that these issues have been longstanding.  She has had multiple issues involving her lower extremity joints.  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  She states that she has seen a podiatrist previously who has referred her to me for management.  She complains of weak ankles and recent inversion injury only a week ago to her left ankle.  She states that she has sprained her ankles multiple times and is concerned that symptoms will progress causing further issues.  She has tried stirrup bracing in the past which does improve symptoms after an injury.  She recently went to the ED after left ankle injury where imaging was performed.  Patient denies any other issues or concerns at this time.    Allergies   Allergen Reactions   • Adhesive Tape Rash     Liquid adhesive -        Past Medical History:   Diagnosis Date   • Hemorrhoid        Past Surgical History:   Procedure Laterality Date   •  SECTION     •  SECTION         History reviewed. No pertinent family history.    Social History     Socioeconomic History   • Marital status:    Tobacco Use   • Smoking status: Current Every Day Smoker   • Smokeless tobacco: Never Used   Vaping Use   • Vaping Use: Never used   Substance and Sexual Activity   • Alcohol use: Yes   • Drug use: Never   • Sexual activity: Defer        Current Outpatient Medications on File Prior to Visit   Medication Sig Dispense Refill   • Acetaminophen (Tylenol) 325 MG capsule TYLENOL 325 MG CAPS     •  "ADDERALL XR 20 MG 24 hr capsule      • amphetamine-dextroamphetamine (ADDERALL) 10 MG tablet      • HYDROcodone-acetaminophen (NORCO) 7.5-325 MG per tablet      • Ibuprofen (ADVIL PO) ADVIL CAPS     • ondansetron (ZOFRAN) 4 MG tablet Take 4 mg by mouth Daily As Needed.     • varenicline (CHANTIX) 1 MG tablet Take 1 mg by mouth 2 (Two) Times a Day.     • amphetamine-dextroamphetamine (ADDERALL) 20 MG tablet TAKE 1 TABLET BY MOUTH EVERY DAY at noon     • Baclofen 2 % cream Apply 1 application topically to the appropriate area as directed Daily.     • methylPREDNISolone (MEDROL) 4 MG dose pack Take as directed on package instructions. 1 each 0     No current facility-administered medications on file prior to visit.       Review of Systems:  General: Denies fever, chills, fatigue, and weakness.  Eyes: Denies vision loss, blurry vision, and excessive redness.  ENT: Denies hearing issues and difficulty swallowing.  Cardiovascular: Denies palpitations, chest pain, or syncopal episodes.  Respiratory: Denies shortness of breath, wheezing, and coughing.  GI: Denies abdominal pain, nausea, and vomiting.   : Denies frequency, hematuria, and urgency.  Musculoskeletal: + Bilateral ankle pain  Derm: Denies rash, open wounds, or suspicious lesions.  Neuro: Denies headaches, numbness, loss of coordination, and tremors.  Psych: Denies anxiety and depression.  Endocrine: Denies temperature intolerance and changes in appetite.  Heme: Denies bleeding disorders or abnormal bruising.     Objective   /85   Pulse 102   Ht 170.2 cm (67\")   Wt 77.1 kg (170 lb)   BMI 26.63 kg/m²     Foot/Ankle Exam:       General:   Appearance: appears stated age and healthy    Orientation: AAOx3    Affect: appropriate    Gait: unimpaired      VASCULAR      Right Foot Vascularity   Normal vascular exam    Dorsalis pedis:  2+  Posterior tibial:  2+  Skin Temperature: warm    Edema Grading:  None  CFT:  < 3 seconds  Pedal Hair Growth:  " Present  Varicosities: none       Left Foot Vascularity   Normal vascular exam    Dorsalis pedis:  2+  Posterior tibial:  2+  Skin Temperature: warm    Edema Grading:  None  CFT:  < 3 seconds  Pedal Hair Growth:  Present  Varicosities: none        NEUROLOGIC     Right Foot Neurologic   Light touch sensation:  Normal  Hot/Cold sensation: normal    Achilles reflex:  2+     Left Foot Neurologic   Light touch sensation:  Normal  Hot/cold sensation: normal    Achilles reflex:  2+     MUSCULOSKELETAL      Right Foot Musculoskeletal   Ecchymosis:  None  Tenderness: none    Arch:  Normal     Left Foot Musculoskeletal   Ecchymosis:  Lateral malleous  Tenderness: lateral malleolus and anterior tibiotalar joint line    Arch:  Normal     MUSCLE STRENGTH     Right Foot Muscle Strength   Normal strength    Foot dorsiflexion:  5  Foot plantar flexion:  5  Foot inversion:  5  Foot eversion:  5     Left Foot Muscle Strength   Normal strength    Foot dorsiflexion:  5  Foot plantar flexion:  5  Foot inversion:  5  Foot eversion:  5     DERMATOLOGIC     Right Foot Dermatologic   Skin: skin intact       Left Foot Dermatologic   Skin: skin intact       TESTS     Right Foot Tests   Anterior drawer: positive    Varus tilt: positive       Left Foot Tests   Anterior drawer: positive    Varus tilt: positive        Right Foot Additional Comments Instability noted with anterior drawer and talar tilt testing involving both lower extremities showing significant laxity and hypermobility.  No crepitus with ankle joint range of motion, bilateral.  No resting deformity.      Assessment/Plan   Diagnoses and all orders for this visit:    1. Chronic ankle pain, bilateral (Primary)    2. Ankle instability, left    3. Ankle instability, right      Patient presents with issues involving both ankles, left greater than right.  She does complain of recent inversion injury involving the left ankle.  Imaging was reviewed showing no obvious fractures or  dislocations.  Clinically, she does have hypermobility and laxity involving both ankles which would be consistent with chronic ankle instability.  Patient's history also fits this diagnosis.  I did review the diagnosis and treatment options.  She has undergone physical therapy in the past with no significant improvement.  I have dispensed a lace up ankle braces.  I have reviewed proper use and effects of the devices.  I would like her to wear them on a daily basis with supportive shoes and monitor improvement.  If she notes significant improvement, may need to consider operative intervention.  We did discuss possibility of ankle arthroscopy, ATFL repair, and tight rope fixation for the syndesmosis.  We briefly discussed the procedure and recovery.  Patient would like to consider her options and follow-up in 3 weeks for reevaluation and further planning as needed.  Greater than 30 minutes was spent before, during, and after evaluation for patient care      No orders of the defined types were placed in this encounter.       Note is dictated utilizing voice recognition software. Unfortunately this leads to occasional typographical errors. I apologize in advance if the situation occurs. If questions occur please do not hesitate to call our office.

## 2021-10-27 ENCOUNTER — PATIENT ROUNDING (BHMG ONLY) (OUTPATIENT)
Dept: PODIATRY | Facility: CLINIC | Age: 36
End: 2021-10-27

## 2021-10-27 NOTE — PROGRESS NOTES
October 27, 2021    Hello, may I speak with Antonia Estrella?    My name is ANÍBAL     I am  with [facility]  [address] [place] [address] [place] [address] [phone].    Before we get started may I verify your date of birth? 1985    I am calling to officially welcome you to our practice and ask about your recent visit. Is this a good time to talk? YES    Tell me about your visit with us. What things went well? VERY NICE DR. REID TO THE POINT AND TOLD ME WHAT IS BEST FOR ME.        We're always looking for ways to make our patients' experiences even better. Do you have recommendations on ways we may improve?  NO     Overall were you satisfied with your first visit to our practice? YES       I appreciate you taking the time to speak with me today. Is there anything else I can do for you? NO       Thank you, and have a great day.

## 2021-11-01 ENCOUNTER — APPOINTMENT (OUTPATIENT)
Dept: GENERAL RADIOLOGY | Facility: HOSPITAL | Age: 36
End: 2021-11-01

## 2021-11-01 ENCOUNTER — HOSPITAL ENCOUNTER (OUTPATIENT)
Dept: GENERAL RADIOLOGY | Facility: HOSPITAL | Age: 36
Discharge: HOME | End: 2021-11-01

## 2021-11-01 ENCOUNTER — LAB (OUTPATIENT)
Dept: LAB | Facility: HOSPITAL | Age: 36
End: 2021-11-01

## 2021-11-01 ENCOUNTER — HOSPITAL ENCOUNTER (OUTPATIENT)
Dept: CARDIOLOGY | Facility: HOSPITAL | Age: 36
Discharge: HOME | End: 2021-11-01

## 2021-11-01 ENCOUNTER — TELEPHONE (OUTPATIENT)
Dept: PODIATRY | Facility: CLINIC | Age: 36
End: 2021-11-01

## 2021-11-01 DIAGNOSIS — M25.372 ANKLE INSTABILITY, LEFT: ICD-10-CM

## 2021-11-01 DIAGNOSIS — M25.372 ANKLE INSTABILITY, LEFT: Primary | ICD-10-CM

## 2021-11-01 LAB
ANION GAP SERPL CALCULATED.3IONS-SCNC: 7.8 MMOL/L (ref 5–15)
BUN SERPL-MCNC: 15 MG/DL (ref 6–20)
BUN/CREAT SERPL: 16.9 (ref 7–25)
CALCIUM SPEC-SCNC: 8.6 MG/DL (ref 8.6–10.5)
CHLORIDE SERPL-SCNC: 100 MMOL/L (ref 98–107)
CO2 SERPL-SCNC: 29.2 MMOL/L (ref 22–29)
CREAT SERPL-MCNC: 0.89 MG/DL (ref 0.57–1)
DEPRECATED RDW RBC AUTO: 44.6 FL (ref 37–54)
ERYTHROCYTE [DISTWIDTH] IN BLOOD BY AUTOMATED COUNT: 12.4 % (ref 12.3–15.4)
GFR SERPL CREATININE-BSD FRML MDRD: 71 ML/MIN/1.73
GLUCOSE SERPL-MCNC: 112 MG/DL (ref 65–99)
HCT VFR BLD AUTO: 44 % (ref 34–46.6)
HGB BLD-MCNC: 14.7 G/DL (ref 12–15.9)
MCH RBC QN AUTO: 32.8 PG (ref 26.6–33)
MCHC RBC AUTO-ENTMCNC: 33.4 G/DL (ref 31.5–35.7)
MCV RBC AUTO: 98.2 FL (ref 79–97)
PLATELET # BLD AUTO: 410 10*3/MM3 (ref 140–450)
PMV BLD AUTO: 9.7 FL (ref 6–12)
POTASSIUM SERPL-SCNC: 3.1 MMOL/L (ref 3.5–5.2)
QT INTERVAL: 393 MS
RBC # BLD AUTO: 4.48 10*6/MM3 (ref 3.77–5.28)
SODIUM SERPL-SCNC: 137 MMOL/L (ref 136–145)
WBC # BLD AUTO: 12.41 10*3/MM3 (ref 3.4–10.8)

## 2021-11-01 PROCEDURE — 71046 X-RAY EXAM CHEST 2 VIEWS: CPT

## 2021-11-01 PROCEDURE — 85027 COMPLETE CBC AUTOMATED: CPT

## 2021-11-01 PROCEDURE — 93010 ELECTROCARDIOGRAM REPORT: CPT | Performed by: INTERNAL MEDICINE

## 2021-11-01 PROCEDURE — 36415 COLL VENOUS BLD VENIPUNCTURE: CPT

## 2021-11-01 PROCEDURE — 93005 ELECTROCARDIOGRAM TRACING: CPT | Performed by: PODIATRIST

## 2021-11-01 PROCEDURE — 80048 BASIC METABOLIC PNL TOTAL CA: CPT

## 2021-11-01 NOTE — TELEPHONE ENCOUNTER
Caller: FRANCOIS  Relationship to Patient: SELF    Phone Number:  [phone]  Reason for Call: PT CALLS STATING THAT SHE WOULD LIKE TO MOVE FORWARD WITH LEFT ANKLE SX. PLEASE ADVISE PT AT ABOVE PHONE NUMBER.

## 2021-11-01 NOTE — TELEPHONE ENCOUNTER
Let Dr. Pineda know that patient would like to proceed with surgery. He will add orders/case request and will get patient scheduled.

## 2021-11-02 NOTE — PAT
Secure chat to Dr. Pineda about wbc = 12.41 and K+=3.1.  Advised pt to call pcp for possible supplement and to increase K+ in her diet.  Will recheck dos.    Pt will come in on 11/3/2021 for u/a with culture per Dr. Pineda.

## 2021-11-03 ENCOUNTER — TELEPHONE (OUTPATIENT)
Dept: ORTHOPEDIC SURGERY | Facility: CLINIC | Age: 36
End: 2021-11-03

## 2021-11-03 ENCOUNTER — LAB (OUTPATIENT)
Dept: LAB | Facility: HOSPITAL | Age: 36
End: 2021-11-03

## 2021-11-03 DIAGNOSIS — M25.372 ANKLE INSTABILITY, LEFT: ICD-10-CM

## 2021-11-03 LAB
BILIRUB UR QL STRIP: NEGATIVE
CLARITY UR: CLEAR
COLOR UR: YELLOW
GLUCOSE UR STRIP-MCNC: NEGATIVE MG/DL
HGB UR QL STRIP.AUTO: NEGATIVE
KETONES UR QL STRIP: NEGATIVE
LEUKOCYTE ESTERASE UR QL STRIP.AUTO: NEGATIVE
NITRITE UR QL STRIP: NEGATIVE
PH UR STRIP.AUTO: 6 [PH] (ref 5–8)
PROT UR QL STRIP: NEGATIVE
SARS-COV-2 ORF1AB RESP QL NAA+PROBE: NOT DETECTED
SP GR UR STRIP: 1.03 (ref 1–1.03)
UROBILINOGEN UR QL STRIP: NORMAL

## 2021-11-03 PROCEDURE — C9803 HOPD COVID-19 SPEC COLLECT: HCPCS

## 2021-11-03 PROCEDURE — 81003 URINALYSIS AUTO W/O SCOPE: CPT | Performed by: PODIATRIST

## 2021-11-03 PROCEDURE — U0004 COV-19 TEST NON-CDC HGH THRU: HCPCS

## 2021-11-03 NOTE — TELEPHONE ENCOUNTER
I spoke with patient. She had juan cortisone injections to her knee's last week. She went for her PAT'S yesterday and they told her that her wbc is elevated. Her pcp states the cortisone could cause this. She just wanted to let us know this information due to her having a sx on friday

## 2021-11-03 NOTE — TELEPHONE ENCOUNTER
Caller: FRANCOIS  Relationship to Patient: SELF    Phone Number: [phone]  Reason for Call: PT CALLED STATING SHE HAD AN ELEVATED WBC AND HAD A REPEAT U./A. PT STATES THAT THEY ADVISED HER THAT BECAUSE SHE HAD CORTISONE INJECTIONS RECENTLY THAT COULD RAISE HER WBC. JUST WANTED OFFICE TO KNOW PLEASE REACH OUT TO PT AT ABOVE PHONE NUMBER.

## 2021-11-05 ENCOUNTER — ANESTHESIA EVENT (OUTPATIENT)
Dept: PERIOP | Facility: HOSPITAL | Age: 36
End: 2021-11-05

## 2021-11-05 ENCOUNTER — HOSPITAL ENCOUNTER (OUTPATIENT)
Facility: HOSPITAL | Age: 36
Setting detail: HOSPITAL OUTPATIENT SURGERY
Discharge: HOME | End: 2021-11-05
Attending: PODIATRIST | Admitting: PODIATRIST

## 2021-11-05 ENCOUNTER — APPOINTMENT (OUTPATIENT)
Dept: GENERAL RADIOLOGY | Facility: HOSPITAL | Age: 36
End: 2021-11-05

## 2021-11-05 ENCOUNTER — ANESTHESIA (OUTPATIENT)
Dept: PERIOP | Facility: HOSPITAL | Age: 36
End: 2021-11-05

## 2021-11-05 VITALS
OXYGEN SATURATION: 94 % | BODY MASS INDEX: 25.88 KG/M2 | WEIGHT: 164.9 LBS | SYSTOLIC BLOOD PRESSURE: 131 MMHG | HEART RATE: 70 BPM | RESPIRATION RATE: 16 BRPM | HEIGHT: 67 IN | TEMPERATURE: 97.3 F | DIASTOLIC BLOOD PRESSURE: 70 MMHG

## 2021-11-05 DIAGNOSIS — M25.372 ANKLE INSTABILITY, LEFT: ICD-10-CM

## 2021-11-05 LAB
B-HCG UR QL: NEGATIVE
POTASSIUM SERPL-SCNC: 3.8 MMOL/L (ref 3.5–5.2)

## 2021-11-05 PROCEDURE — 27695 REPAIR OF ANKLE LIGAMENT: CPT | Performed by: PODIATRIST

## 2021-11-05 PROCEDURE — 29897 ANKLE ARTHROSCOPY/SURGERY: CPT | Performed by: PODIATRIST

## 2021-11-05 PROCEDURE — 25010000002 MIDAZOLAM PER 1 MG

## 2021-11-05 PROCEDURE — 27829 TREAT LOWER LEG JOINT: CPT | Performed by: PODIATRIST

## 2021-11-05 PROCEDURE — 25010000002 PROPOFOL 10 MG/ML EMULSION

## 2021-11-05 PROCEDURE — 25010000002 ONDANSETRON PER 1 MG

## 2021-11-05 PROCEDURE — 81025 URINE PREGNANCY TEST: CPT | Performed by: PODIATRIST

## 2021-11-05 PROCEDURE — C1713 ANCHOR/SCREW BN/BN,TIS/BN: HCPCS | Performed by: PODIATRIST

## 2021-11-05 PROCEDURE — 73600 X-RAY EXAM OF ANKLE: CPT

## 2021-11-05 PROCEDURE — 25010000002 HYDROMORPHONE PER 4 MG

## 2021-11-05 PROCEDURE — 25010000002 FENTANYL CITRATE (PF) 100 MCG/2ML SOLUTION

## 2021-11-05 PROCEDURE — 84132 ASSAY OF SERUM POTASSIUM: CPT | Performed by: PODIATRIST

## 2021-11-05 PROCEDURE — 25010000002 DEXAMETHASONE PER 1 MG

## 2021-11-05 PROCEDURE — 25010000002 DROPERIDOL PER 5 MG

## 2021-11-05 PROCEDURE — 76000 FLUOROSCOPY <1 HR PHYS/QHP: CPT

## 2021-11-05 DEVICE — SYS IMP ANKL INTERNALBRACE AUG LIGMT BIOCOMP STD: Type: IMPLANTABLE DEVICE | Site: ANKLE | Status: FUNCTIONAL

## 2021-11-05 DEVICE — SYS SYNDESMOSIS REPR ANKL TIGHTROPE/XP KNOTLS SS: Type: IMPLANTABLE DEVICE | Site: ANKLE | Status: FUNCTIONAL

## 2021-11-05 RX ORDER — DEXAMETHASONE SODIUM PHOSPHATE 4 MG/ML
INJECTION, SOLUTION INTRA-ARTICULAR; INTRALESIONAL; INTRAMUSCULAR; INTRAVENOUS; SOFT TISSUE AS NEEDED
Status: DISCONTINUED | OUTPATIENT
Start: 2021-11-05 | End: 2021-11-05 | Stop reason: SURG

## 2021-11-05 RX ORDER — BUPIVACAINE HYDROCHLORIDE 5 MG/ML
INJECTION, SOLUTION PERINEURAL AS NEEDED
Status: DISCONTINUED | OUTPATIENT
Start: 2021-11-05 | End: 2021-11-05 | Stop reason: HOSPADM

## 2021-11-05 RX ORDER — ONDANSETRON 2 MG/ML
INJECTION INTRAMUSCULAR; INTRAVENOUS AS NEEDED
Status: DISCONTINUED | OUTPATIENT
Start: 2021-11-05 | End: 2021-11-05 | Stop reason: SURG

## 2021-11-05 RX ORDER — IPRATROPIUM BROMIDE AND ALBUTEROL SULFATE 2.5; .5 MG/3ML; MG/3ML
3 SOLUTION RESPIRATORY (INHALATION) ONCE AS NEEDED
Status: DISCONTINUED | OUTPATIENT
Start: 2021-11-05 | End: 2021-11-05 | Stop reason: HOSPADM

## 2021-11-05 RX ORDER — DROPERIDOL 2.5 MG/ML
0.62 INJECTION, SOLUTION INTRAMUSCULAR; INTRAVENOUS ONCE AS NEEDED
Status: COMPLETED | OUTPATIENT
Start: 2021-11-05 | End: 2021-11-05

## 2021-11-05 RX ORDER — FLUMAZENIL 0.1 MG/ML
0.2 INJECTION INTRAVENOUS AS NEEDED
Status: DISCONTINUED | OUTPATIENT
Start: 2021-11-05 | End: 2021-11-05 | Stop reason: HOSPADM

## 2021-11-05 RX ORDER — MIDAZOLAM HYDROCHLORIDE 1 MG/ML
INJECTION INTRAMUSCULAR; INTRAVENOUS AS NEEDED
Status: DISCONTINUED | OUTPATIENT
Start: 2021-11-05 | End: 2021-11-05 | Stop reason: SURG

## 2021-11-05 RX ORDER — HYDROMORPHONE HCL 110MG/55ML
1 PATIENT CONTROLLED ANALGESIA SYRINGE INTRAVENOUS
Status: DISCONTINUED | OUTPATIENT
Start: 2021-11-05 | End: 2021-11-05 | Stop reason: HOSPADM

## 2021-11-05 RX ORDER — HYDRALAZINE HYDROCHLORIDE 20 MG/ML
5 INJECTION INTRAMUSCULAR; INTRAVENOUS
Status: DISCONTINUED | OUTPATIENT
Start: 2021-11-05 | End: 2021-11-05 | Stop reason: HOSPADM

## 2021-11-05 RX ORDER — PROMETHAZINE HYDROCHLORIDE 25 MG/1
25 SUPPOSITORY RECTAL ONCE AS NEEDED
Status: DISCONTINUED | OUTPATIENT
Start: 2021-11-05 | End: 2021-11-05 | Stop reason: HOSPADM

## 2021-11-05 RX ORDER — OXYCODONE HYDROCHLORIDE 5 MG/1
10 TABLET ORAL ONCE AS NEEDED
Status: DISCONTINUED | OUTPATIENT
Start: 2021-11-05 | End: 2021-11-05 | Stop reason: HOSPADM

## 2021-11-05 RX ORDER — HYDROMORPHONE HCL 110MG/55ML
0.25 PATIENT CONTROLLED ANALGESIA SYRINGE INTRAVENOUS
Status: DISCONTINUED | OUTPATIENT
Start: 2021-11-05 | End: 2021-11-05 | Stop reason: HOSPADM

## 2021-11-05 RX ORDER — SCOLOPAMINE TRANSDERMAL SYSTEM 1 MG/1
PATCH, EXTENDED RELEASE TRANSDERMAL AS NEEDED
Status: DISCONTINUED | OUTPATIENT
Start: 2021-11-05 | End: 2021-11-05 | Stop reason: SURG

## 2021-11-05 RX ORDER — SODIUM CHLORIDE, SODIUM LACTATE, POTASSIUM CHLORIDE, CALCIUM CHLORIDE 600; 310; 30; 20 MG/100ML; MG/100ML; MG/100ML; MG/100ML
1000 INJECTION, SOLUTION INTRAVENOUS CONTINUOUS
Status: DISCONTINUED | OUTPATIENT
Start: 2021-11-05 | End: 2021-11-05 | Stop reason: HOSPADM

## 2021-11-05 RX ORDER — FENTANYL CITRATE 50 UG/ML
INJECTION, SOLUTION INTRAMUSCULAR; INTRAVENOUS AS NEEDED
Status: DISCONTINUED | OUTPATIENT
Start: 2021-11-05 | End: 2021-11-05 | Stop reason: SURG

## 2021-11-05 RX ORDER — MEPERIDINE HYDROCHLORIDE 25 MG/ML
12.5 INJECTION INTRAMUSCULAR; INTRAVENOUS; SUBCUTANEOUS
Status: DISCONTINUED | OUTPATIENT
Start: 2021-11-05 | End: 2021-11-05 | Stop reason: HOSPADM

## 2021-11-05 RX ORDER — ACETAMINOPHEN 325 MG/1
650 TABLET ORAL ONCE AS NEEDED
Status: DISCONTINUED | OUTPATIENT
Start: 2021-11-05 | End: 2021-11-05 | Stop reason: HOSPADM

## 2021-11-05 RX ORDER — HYDROMORPHONE HCL 110MG/55ML
0.5 PATIENT CONTROLLED ANALGESIA SYRINGE INTRAVENOUS
Status: DISCONTINUED | OUTPATIENT
Start: 2021-11-05 | End: 2021-11-05 | Stop reason: HOSPADM

## 2021-11-05 RX ORDER — PROMETHAZINE HYDROCHLORIDE 25 MG/1
25 TABLET ORAL ONCE AS NEEDED
Status: DISCONTINUED | OUTPATIENT
Start: 2021-11-05 | End: 2021-11-05 | Stop reason: HOSPADM

## 2021-11-05 RX ORDER — NALOXONE HCL 0.4 MG/ML
0.4 VIAL (ML) INJECTION AS NEEDED
Status: DISCONTINUED | OUTPATIENT
Start: 2021-11-05 | End: 2021-11-05 | Stop reason: HOSPADM

## 2021-11-05 RX ORDER — ACETAMINOPHEN 650 MG/1
650 SUPPOSITORY RECTAL ONCE AS NEEDED
Status: DISCONTINUED | OUTPATIENT
Start: 2021-11-05 | End: 2021-11-05 | Stop reason: HOSPADM

## 2021-11-05 RX ORDER — PROPOFOL 10 MG/ML
VIAL (ML) INTRAVENOUS AS NEEDED
Status: DISCONTINUED | OUTPATIENT
Start: 2021-11-05 | End: 2021-11-05 | Stop reason: SURG

## 2021-11-05 RX ORDER — LIDOCAINE HYDROCHLORIDE 10 MG/ML
0.5 INJECTION, SOLUTION INFILTRATION; PERINEURAL ONCE AS NEEDED
Status: DISCONTINUED | OUTPATIENT
Start: 2021-11-05 | End: 2021-11-05 | Stop reason: HOSPADM

## 2021-11-05 RX ORDER — OXYCODONE HYDROCHLORIDE 5 MG/1
15 TABLET ORAL EVERY 4 HOURS PRN
Status: DISCONTINUED | OUTPATIENT
Start: 2021-11-05 | End: 2021-11-05 | Stop reason: HOSPADM

## 2021-11-05 RX ORDER — LABETALOL HYDROCHLORIDE 5 MG/ML
5 INJECTION, SOLUTION INTRAVENOUS
Status: DISCONTINUED | OUTPATIENT
Start: 2021-11-05 | End: 2021-11-05 | Stop reason: HOSPADM

## 2021-11-05 RX ORDER — SODIUM CHLORIDE 0.9 % (FLUSH) 0.9 %
10 SYRINGE (ML) INJECTION AS NEEDED
Status: DISCONTINUED | OUTPATIENT
Start: 2021-11-05 | End: 2021-11-05 | Stop reason: HOSPADM

## 2021-11-05 RX ORDER — LIDOCAINE HYDROCHLORIDE 10 MG/ML
INJECTION, SOLUTION EPIDURAL; INFILTRATION; INTRACAUDAL; PERINEURAL AS NEEDED
Status: DISCONTINUED | OUTPATIENT
Start: 2021-11-05 | End: 2021-11-05 | Stop reason: SURG

## 2021-11-05 RX ADMIN — HYDROMORPHONE HYDROCHLORIDE 1 MG: 2 INJECTION, SOLUTION INTRAMUSCULAR; INTRAVENOUS; SUBCUTANEOUS at 17:05

## 2021-11-05 RX ADMIN — FENTANYL CITRATE 25 MCG: 50 INJECTION, SOLUTION INTRAMUSCULAR; INTRAVENOUS at 16:06

## 2021-11-05 RX ADMIN — HYDROMORPHONE HYDROCHLORIDE 1 MG: 2 INJECTION, SOLUTION INTRAMUSCULAR; INTRAVENOUS; SUBCUTANEOUS at 16:20

## 2021-11-05 RX ADMIN — FENTANYL CITRATE 50 MCG: 50 INJECTION, SOLUTION INTRAMUSCULAR; INTRAVENOUS at 15:30

## 2021-11-05 RX ADMIN — PROPOFOL 150 MG: 10 INJECTION, EMULSION INTRAVENOUS at 14:38

## 2021-11-05 RX ADMIN — DEXAMETHASONE SODIUM PHOSPHATE 4 MG: 4 INJECTION, SOLUTION INTRAMUSCULAR; INTRAVENOUS at 14:42

## 2021-11-05 RX ADMIN — SCOPALAMINE 1 PATCH: 1 PATCH, EXTENDED RELEASE TRANSDERMAL at 14:15

## 2021-11-05 RX ADMIN — CEFAZOLIN SODIUM 2 G: 1 INJECTION, POWDER, FOR SOLUTION INTRAMUSCULAR; INTRAVENOUS at 14:43

## 2021-11-05 RX ADMIN — DROPERIDOL 0.62 MG: 2.5 INJECTION, SOLUTION INTRAMUSCULAR; INTRAVENOUS at 18:11

## 2021-11-05 RX ADMIN — ONDANSETRON 4 MG: 2 INJECTION INTRAMUSCULAR; INTRAVENOUS at 15:55

## 2021-11-05 RX ADMIN — LIDOCAINE HYDROCHLORIDE 50 MG: 10 INJECTION, SOLUTION EPIDURAL; INFILTRATION; INTRACAUDAL; PERINEURAL at 14:38

## 2021-11-05 RX ADMIN — HYDROMORPHONE HYDROCHLORIDE 1 MG: 2 INJECTION, SOLUTION INTRAMUSCULAR; INTRAVENOUS; SUBCUTANEOUS at 16:36

## 2021-11-05 RX ADMIN — FENTANYL CITRATE 50 MCG: 50 INJECTION, SOLUTION INTRAMUSCULAR; INTRAVENOUS at 14:38

## 2021-11-05 RX ADMIN — FENTANYL CITRATE 50 MCG: 50 INJECTION, SOLUTION INTRAMUSCULAR; INTRAVENOUS at 15:00

## 2021-11-05 RX ADMIN — SODIUM CHLORIDE, POTASSIUM CHLORIDE, SODIUM LACTATE AND CALCIUM CHLORIDE 1000 ML: 600; 310; 30; 20 INJECTION, SOLUTION INTRAVENOUS at 12:04

## 2021-11-05 RX ADMIN — FENTANYL CITRATE 25 MCG: 50 INJECTION, SOLUTION INTRAMUSCULAR; INTRAVENOUS at 16:04

## 2021-11-05 RX ADMIN — MIDAZOLAM 2 MG: 1 INJECTION INTRAMUSCULAR; INTRAVENOUS at 14:35

## 2021-11-05 RX ADMIN — OXYCODONE 15 MG: 5 TABLET ORAL at 16:52

## 2021-11-05 NOTE — ANESTHESIA POSTPROCEDURE EVALUATION
Patient: Antonia Estrella    Procedure Summary     Date: 11/05/21 Room / Location: [facility] OR 11 / [facility] MAIN OR    Anesthesia Start: 1433 Anesthesia Stop: 1616    Procedure: ANKLE ARTHROSCOPY AND ANKLE LIGAMENT RECONSTRUCTION OF ANTERIOR TALO-FIBULAR LIGAMENT AND SYNDESMOSIS (Left Ankle) Diagnosis:       Ankle instability, left      (Ankle instability, left [M25.372])    Surgeons: VERONIKA Pineda DPM Provider: Porter Lynne MD    Anesthesia Type: general ASA Status: 2          Anesthesia Type: general    Vitals  Vitals Value Taken Time   /53 11/05/21 1641   Temp 100 °F (37.8 °C) 11/05/21 1614   Pulse 80 11/05/21 1646   Resp 11 11/05/21 1629   SpO2 96 % 11/05/21 1646   Vitals shown include unvalidated device data.        Post Anesthesia Care and Evaluation    Patient location during evaluation: PACU  Patient participation: complete - patient participated  Level of consciousness: awake  Pain scale: See nurse's notes for pain score.  Pain management: adequate  Airway patency: patent  Anesthetic complications: No anesthetic complications  PONV Status: none  Cardiovascular status: acceptable  Respiratory status: acceptable  Hydration status: acceptable    Comments: Patient seen and examined postoperatively; vital signs stable; SpO2 greater than or equal to 90%; cardiopulmonary status stable; nausea/vomiting adequately controlled; pain adequately controlled; no apparent anesthesia complications; patient discharged from anesthesia care when discharge criteria were met

## 2021-11-05 NOTE — OP NOTE
Operative Note   Foot and Ankle Surgery   Provider: Dr. Melvin Pineda   Location: [facility]      Procedure:  1.  Ankle arthroscopy with debridement, left  2.  Anterior talofibular ligament repair, left  3.  Syndesmotic fixation, left    Pre-operative Diagnosis:   1.  Chronic left ankle pain  2.  Ankle instability, left  3.  Syndesmotic disruption, left    Post-operative Diagnosis: Same    Surgeon: Melvin Pineda    Assistant: None    Anesthesia: General    Implants: Arthrex internal brace; Arthrex tight rope    Findings: Mild chronic synovitis noted to the anterior medial and lateral aspects of the ankle joint.  Prominent instability to the anterior talofibular ligament.  Noted instability to the syndesmosis with arthroscopy.    Specimen: None    Blood Loss: Less than 5cc    Complications: None    Post Op Plan: Discharge home.  Strict nonweightbearing activity.  Follow-up with me in 2 weeks    Summary:    Patient is a 36-year-old female that has been seen in office for pain involving her ankles.  She states that the left is worse than the right.  She has undergone multiple conservative treatments for these issues.  She has seen an outside provider and undergone bracing and physical therapy.  She does have prominent ankle instability noted on exam.  We did discuss operative options to include ankle arthroscopy and anterior talofibular ligament repair.  We also discussed the possibility of syndesmotic reduction.  Patient understands and wishes to proceed.  She does know that she will will have nonweightbearing and decreased overall activity after surgery.    Procedure, risks, complications, and goals were discussed with the patient at bedside.  Risks include but are not limited to infection, complications from anesthesia (including death), chronic pain or numbness, hematoma/seroma, deep vein thrombosis, wound complications, and potential for additional surgical procedures.  Patient understands and elects to  proceed with surgery at this time. Informed consent was obtained before proceeding to the operating suite.  All questions were answered to the patient's satisfaction. No guarantees or assurances were given or implied.    Procedure:     Patient was brought to the operating room and placed on the operative table in supine position.  Once adequate general anesthesia was administered, a pneumatic tourniquet was placed about the patient's operative thigh.  The lower extremity was positioned, scrubbed, and prepped in the standard fashion.  The limb was elevated exsanguinated and the pneumatic tourniquet was inflated to 300 mm of mercury.  A formal time-out was conducted prior skin incision.    Attention was directed to the anterior aspect of the ankle.  The anterior medial portal was identified medial to the tibialis anterior tendon. A small stab incision was performed with blunt dissection to the level of the joint. The joint was insufflated with approximately 15 cc of normal saline.  The cannula and trocar were introduced into the ankle followed by the arthroscope.  The joint was fully evaluated obtaining pictures throughout the case.  Mild chronic synovitis was noted to the anterior medial and lateral aspects of the joint.  No obvious osteochondral defects were present.  The anterior lateral portal was obtained under direct visualization.  Again a stab incision with blunt dissection was performed to the level of the capsule.  The trocar was introduced followed by a 3 mm shaver.  The synovitis was ablated with thus decreasing the impingement with ankle dorsiflexion.  Final images were obtained.  The ankle joint was irrigated with copious amounts of normal saline.  All instrumentation was removed and the ankle was decompressed.  The medial stab incision was closed with 3-0 nylon in a simple interrupted manner.    Attention was then directed to the lateral aspect of the ankle.  The anterior talofibular ligament repair was  performed through a curvilinear incision starting from the anterior lateral portal and extending distally along the anterior aspect of the fibula.  The incision was opened in layers to the level of the ankle joint capsule.  Vital structures were identified and preserved.  A small stab incision was performed overlying the anterior distal lateral malleolus and the body and neck of the talus.  Blunt dissection was performed allowing full visualization of the underlying bone.  A 4.75 Arthrex SwiveLock anchor was placed to the talus in the standard fashion.  The FiberTape was then routed through the ankle joint capsule towards the fibula.  A 3.5 mm swivel lock anchor was placed into the distal fibula with the ankle in neutral position. Appropriate tension was placed across the device allowing for significant reduction in anterior excursion of the talus.  The wound was irrigated with copious amounts normal saline.    Finally, under fluoroscopic guidance a line was drawn parallel to the tibiotalar joint.  A small stab incision was performed to the midline of the distal fibula just proximal to the joint line.  Blunt dissection was performed to the level of bone.  A trocar was then advanced through the fibula and across the tibia parallel to the tibiotalar joint.  The wire was then removed and the tight rope device was placed and deployed.  The ankle was dorsiflexed and a periarticular clamp was placed to the medial and lateral malleolus.  The tight rope was then tensioned without complication allowing for syndesmotic disruption.  The periarticular clamp was removed.  Final images were performed showing excellent reduction.    The deep structures were closed with a 2-0 Vicryl in a simple interrupted manner.  The subcutaneous tissue was reapproximated with a 4-0 Vicryl.  The skin was reapproximated with 3-0 nylon sutures. The incisions were dressed with Xeroform and sterile compressive dressings.  The thigh tourniquet was  released and a prompt hyperemic response was noted to all digits of the foot. A well-padded posterior splint was applied to the operative limb.  Patient tolerated the procedure and anesthesia well.  Patient was transferred from the operating room to the recovery room with vital signs stable and neurovascular status intact to the lower extremity.      Dr. Melvin Pineda DPM  [facility]  [phone]    Note is dictated utilizing voice recognition software. Unfortunately this leads to occasional typographical errors. I apologize in advance if the situation occurs. If questions occur please do not hesitate to call our office.

## 2021-11-05 NOTE — ANESTHESIA PREPROCEDURE EVALUATION
Anesthesia Evaluation     Patient summary reviewed and Nursing notes reviewed   history of anesthetic complications: PONV  NPO Solid Status: > 8 hours  NPO Liquid Status: > 8 hours           Airway   Mallampati: II  TM distance: >3 FB  Neck ROM: full  No difficulty expected  Dental - normal exam     Pulmonary - normal exam   (+) a smoker Current Abstained day of surgery,   Cardiovascular - negative cardio ROS and normal exam        Neuro/Psych  (+) psychiatric history ADHD,     GI/Hepatic/Renal/Endo - negative ROS     Musculoskeletal (-) negative ROS    Abdominal  - normal exam    Bowel sounds: normal.   Substance History - negative use     OB/GYN negative ob/gyn ROS         Other                      Anesthesia Plan    ASA 2     general     intravenous induction     Anesthetic plan, all risks, benefits, and alternatives have been provided, discussed and informed consent has been obtained with: patient.    Plan discussed with CRNA and CAA.

## 2021-11-05 NOTE — ANESTHESIA PROCEDURE NOTES
Airway  Urgency: elective    Date/Time: 11/5/2021 2:39 PM  Airway not difficult    General Information and Staff    Patient location during procedure: OR  CRNA: Jeffry Amador CAA    Indications and Patient Condition  Indications for airway management: airway protection    Preoxygenated: yes  MILS maintained throughout  Mask difficulty assessment: 0 - not attempted    Final Airway Details  Final airway type: supraglottic airway      Successful airway: unique  Size 4    Number of attempts at approach: 1  Assessment: lips, teeth, and gum same as pre-op and atraumatic intubation

## 2021-11-15 ENCOUNTER — OFFICE VISIT (OUTPATIENT)
Dept: PODIATRY | Facility: CLINIC | Age: 36
End: 2021-11-15

## 2021-11-15 VITALS
DIASTOLIC BLOOD PRESSURE: 60 MMHG | WEIGHT: 164 LBS | HEIGHT: 67 IN | HEART RATE: 68 BPM | BODY MASS INDEX: 25.74 KG/M2 | SYSTOLIC BLOOD PRESSURE: 108 MMHG

## 2021-11-15 DIAGNOSIS — M25.372 ANKLE INSTABILITY, LEFT: Primary | ICD-10-CM

## 2021-11-15 PROCEDURE — 99024 POSTOP FOLLOW-UP VISIT: CPT | Performed by: PODIATRIST

## 2021-11-15 RX ORDER — POTASSIUM CHLORIDE 750 MG/1
TABLET, FILM COATED, EXTENDED RELEASE ORAL
COMMUNITY
Start: 2021-11-03 | End: 2022-12-13

## 2021-11-15 RX ORDER — DICLOFENAC SODIUM 75 MG/1
75 TABLET, DELAYED RELEASE ORAL 2 TIMES DAILY
COMMUNITY
Start: 2021-10-28 | End: 2022-12-13

## 2021-11-16 NOTE — PROGRESS NOTES
"11/15/2021  Foot and Ankle Surgery - Established Patient/Follow-up  Provider: Dr. Melvin Pineda DPM  Location: [facility]    Subjective:  Antonia Estrella is a 36 y.o. female.     Chief Complaint   Patient presents with   • Left Ankle - Pain   • Post-op     last pcp appt 6/1/2021       HPI: Patient returns 2 weeks after ankle arthroscopy, ATFL repair, and tight rope fixation.  She states that she is doing well and has remained off weightbearing as discussed.  She continues to have discomfort and tightness.    Allergies   Allergen Reactions   • Adhesive Tape Rash     Liquid adhesive -        Current Outpatient Medications on File Prior to Visit   Medication Sig Dispense Refill   • Acetaminophen (Tylenol) 325 MG capsule Take 650 mg by mouth As Needed.     • ADDERALL XR 20 MG 24 hr capsule Take 20 mg by mouth Every Morning       • Baclofen 2 % cream Apply 1 application topically to the appropriate area as directed Daily. dont use once bathing with chg     • HYDROcodone-acetaminophen (NORCO) 7.5-325 MG per tablet Take 1 tablet by mouth 4 (Four) Times a Day As Needed. May take preop     • Ibuprofen (ADVIL PO) Take 400 mg by mouth 2 (Two) Times a Day.     • ondansetron (ZOFRAN) 4 MG tablet Take 4 mg by mouth Daily As Needed. If needed may take preop     • varenicline (CHANTIX) 1 MG tablet Take 1 mg by mouth 2 (Two) Times a Day.     • amphetamine-dextroamphetamine (ADDERALL) 10 MG tablet Take 20 mg by mouth Daily. noon     • diclofenac (VOLTAREN) 75 MG EC tablet Take 75 mg by mouth 2 (Two) Times a Day.     • methylPREDNISolone (MEDROL) 4 MG dose pack Take as directed on package instructions. 1 each 0   • potassium chloride 10 MEQ CR tablet        No current facility-administered medications on file prior to visit.       Objective   /60   Pulse 68   Ht 170.2 cm (67\")   Wt 74.4 kg (164 lb)   BMI 25.69 kg/m²     General:   Appearance: appears stated age and healthy    Orientation: AAOx3    Affect: " appropriate    Gait: unimpaired       VASCULAR       Right Foot Vascularity   Normal vascular exam    Dorsalis pedis:  2+  Posterior tibial:  2+  Skin Temperature: warm    Edema Grading:  None  CFT:  < 3 seconds  Pedal Hair Growth:  Present  Varicosities: none        Left Foot Vascularity   Normal vascular exam    Dorsalis pedis:  2+  Posterior tibial:  2+  Skin Temperature: warm    Edema Grading:  None  CFT:  < 3 seconds  Pedal Hair Growth:  Present  Varicosities: none        NEUROLOGIC      Right Foot Neurologic   Light touch sensation:  Normal  Hot/Cold sensation: normal    Achilles reflex:  2+      Left Foot Neurologic   Light touch sensation:  Normal  Hot/cold sensation: normal    Achilles reflex:  2+      MUSCULOSKELETAL       Right Foot Musculoskeletal   Ecchymosis:  None  Tenderness: none    Arch:  Normal      Left Foot Musculoskeletal   Ecchymosis:  Lateral malleous  Tenderness: lateral malleolus and anterior tibiotalar joint line    Arch:  Normal      MUSCLE STRENGTH      Right Foot Muscle Strength   Normal strength    Foot dorsiflexion:  5  Foot plantar flexion:  5  Foot inversion:  5  Foot eversion:  5      Left Foot Muscle Strength   Normal strength    Foot dorsiflexion:  5  Foot plantar flexion:  5  Foot inversion:  5  Foot eversion:  5      DERMATOLOGIC      Right Foot Dermatologic   Skin: skin intact        Left Foot Dermatologic   Skin: Incision sites are dry and stable with intact sutures. No evidence of dehiscence or infection      TESTS      Right Foot Tests   Anterior drawer: positive    Varus tilt: positive       Assessment/Plan   Diagnoses and all orders for this visit:    1. Ankle instability, left (Primary)      Patient is doing quite well at this time.  Sutures were removed today without issue.  I have recommended that she start gentle range of motion exercises to the ankle.  I have transitioned her to a cam boot.  I would like her to proceed with partial protected weightbearing as needed.   I do feel that symptoms will gradually improve.  She is to avoid high-impact and excessive activity.  I would like to see her in 2 weeks for reevaluation and imaging.    No orders of the defined types were placed in this encounter.         Note is dictated utilizing voice recognition software. Unfortunately this leads to occasional typographical errors. I apologize in advance if the situation occurs. If questions occur please do not hesitate to call our office.

## 2021-11-29 ENCOUNTER — OFFICE VISIT (OUTPATIENT)
Dept: PODIATRY | Facility: CLINIC | Age: 36
End: 2021-11-29

## 2021-11-29 VITALS
BODY MASS INDEX: 25.74 KG/M2 | SYSTOLIC BLOOD PRESSURE: 130 MMHG | HEART RATE: 84 BPM | WEIGHT: 164 LBS | DIASTOLIC BLOOD PRESSURE: 83 MMHG | HEIGHT: 67 IN

## 2021-11-29 DIAGNOSIS — M25.372 ANKLE INSTABILITY, LEFT: ICD-10-CM

## 2021-11-29 DIAGNOSIS — M79.672 LEFT FOOT PAIN: Primary | ICD-10-CM

## 2021-11-29 PROCEDURE — 99024 POSTOP FOLLOW-UP VISIT: CPT | Performed by: PODIATRIST

## 2021-11-29 NOTE — PROGRESS NOTES
"11/29/2021  Foot and Ankle Surgery - Established Patient/Follow-up  Provider: Dr. Melvin Pineda DPM  Location: [facility]    Subjective:  Antonia Estrella is a 36 y.o. female.     Chief Complaint   Patient presents with   • Left Ankle - Pain   • Follow-up     last pcp appt 11/3/2021       HPI: Patient returns 4 weeks after surgery and states that she is making improvements.  She continues to have limitation involving the ankle.    Allergies   Allergen Reactions   • Adhesive Tape Rash     Liquid adhesive -        Current Outpatient Medications on File Prior to Visit   Medication Sig Dispense Refill   • Acetaminophen (Tylenol) 325 MG capsule Take 650 mg by mouth As Needed.     • amphetamine-dextroamphetamine (ADDERALL) 10 MG tablet Take 20 mg by mouth Daily. noon     • Baclofen 2 % cream Apply 1 application topically to the appropriate area as directed Daily. dont use once bathing with chg     • diclofenac (VOLTAREN) 75 MG EC tablet Take 75 mg by mouth 2 (Two) Times a Day.     • HYDROcodone-acetaminophen (NORCO) 7.5-325 MG per tablet Take 1 tablet by mouth 4 (Four) Times a Day As Needed. May take preop     • Ibuprofen (ADVIL PO) Take 400 mg by mouth 2 (Two) Times a Day.     • ondansetron (ZOFRAN) 4 MG tablet Take 4 mg by mouth Daily As Needed. If needed may take preop     • potassium chloride 10 MEQ CR tablet      • varenicline (CHANTIX) 1 MG tablet Take 1 mg by mouth 2 (Two) Times a Day.     • [DISCONTINUED] ADDERALL XR 20 MG 24 hr capsule Take 20 mg by mouth Every Morning       • [DISCONTINUED] methylPREDNISolone (MEDROL) 4 MG dose pack Take as directed on package instructions. 1 each 0     No current facility-administered medications on file prior to visit.       Objective   /83   Pulse 84   Ht 170.2 cm (67\")   Wt 74.4 kg (164 lb)   BMI 25.69 kg/m²     General:   Appearance: appears stated age and healthy    Orientation: AAOx3    Affect: appropriate    Gait: " unimpaired       VASCULAR       Right Foot Vascularity   Normal vascular exam    Dorsalis pedis:  2+  Posterior tibial:  2+  Skin Temperature: warm    Edema Grading:  None  CFT:  < 3 seconds  Pedal Hair Growth:  Present  Varicosities: none        Left Foot Vascularity   Normal vascular exam    Dorsalis pedis:  2+  Posterior tibial:  2+  Skin Temperature: warm    Edema Grading:  None  CFT:  < 3 seconds  Pedal Hair Growth:  Present  Varicosities: none        NEUROLOGIC      Right Foot Neurologic   Light touch sensation:  Normal  Hot/Cold sensation: normal    Achilles reflex:  2+      Left Foot Neurologic   Light touch sensation:  Normal  Hot/cold sensation: normal    Achilles reflex:  2+      MUSCULOSKELETAL       Right Foot Musculoskeletal   Ecchymosis:  None  Tenderness: none    Arch:  Normal      Left Foot Musculoskeletal   Ecchymosis:  Lateral malleous  Tenderness: lateral malleolus and anterior tibiotalar joint line    Arch:  Normal      MUSCLE STRENGTH      Right Foot Muscle Strength   Normal strength    Foot dorsiflexion:  5  Foot plantar flexion:  5  Foot inversion:  5  Foot eversion:  5      Left Foot Muscle Strength   Normal strength    Foot dorsiflexion:  5  Foot plantar flexion:  5  Foot inversion:  5  Foot eversion:  5      DERMATOLOGIC      Right Foot Dermatologic   Skin: skin intact        Left Foot Dermatologic   Skin: Incision sites are dry and stable with intact sutures. No evidence of dehiscence or infection      TESTS      Right Foot Tests   Anterior drawer: positive    Varus tilt: positive      Assessment/Plan   Diagnoses and all orders for this visit:    1. Left foot pain (Primary)  -     Cancel: XR Ankle 3+ View Left  -     XR Ankle 3+ View Left    2. Ankle instability, left      Patient is doing quite well.  Her incision sites are well-healed.  No complicating features are noted today.  I would like her to start formal physical therapy.  She is to remain in the cam boot for the next 1 to 2  weeks and then gradually return to her regular shoe with the use of the lace up ankle brace.  We did discuss continued at home exercises.  She is to return in 4 weeks for reevaluation.    Orders Placed This Encounter   Procedures   • XR Ankle 3+ View Left     ankle arthroscopy, ATFL repair, and tight rope fixation     Scheduling Instructions:      Room 13      Partial protected weigthbear     Order Specific Question:   Reason for Exam:     Answer:   left ankle pain     Order Specific Question:   Patient Pregnant     Answer:   No     Order Specific Question:   Does this patient have a diabetic monitoring/medication delivering device on?     Answer:   No     Order Specific Question:   Release to patient     Answer:   Immediate          Note is dictated utilizing voice recognition software. Unfortunately this leads to occasional typographical errors. I apologize in advance if the situation occurs. If questions occur please do not hesitate to call our office.

## 2021-12-10 ENCOUNTER — TELEPHONE (OUTPATIENT)
Dept: ORTHOPEDIC SURGERY | Facility: CLINIC | Age: 36
End: 2021-12-10

## 2021-12-10 NOTE — TELEPHONE ENCOUNTER
PT wanted to know if Dr. Pineda order can include her left knee as well.  They would like to not need another referral.

## 2022-01-13 ENCOUNTER — OFFICE VISIT (OUTPATIENT)
Dept: PODIATRY | Facility: CLINIC | Age: 37
End: 2022-01-13

## 2022-01-13 VITALS
HEART RATE: 109 BPM | DIASTOLIC BLOOD PRESSURE: 88 MMHG | WEIGHT: 164 LBS | SYSTOLIC BLOOD PRESSURE: 127 MMHG | BODY MASS INDEX: 25.74 KG/M2 | HEIGHT: 67 IN

## 2022-01-13 DIAGNOSIS — M25.372 ANKLE INSTABILITY, LEFT: Primary | ICD-10-CM

## 2022-01-13 PROCEDURE — 99024 POSTOP FOLLOW-UP VISIT: CPT | Performed by: PODIATRIST

## 2022-01-13 RX ORDER — DEXTROAMPHETAMINE SACCHARATE, AMPHETAMINE ASPARTATE, DEXTROAMPHETAMINE SULFATE AND AMPHETAMINE SULFATE 5; 5; 5; 5 MG/1; MG/1; MG/1; MG/1
1 TABLET ORAL 3 TIMES DAILY
COMMUNITY
Start: 2021-12-23

## 2022-03-08 ENCOUNTER — OFFICE VISIT (OUTPATIENT)
Dept: PODIATRY | Facility: CLINIC | Age: 37
End: 2022-03-08

## 2022-03-08 VITALS — HEIGHT: 67 IN | WEIGHT: 164 LBS | BODY MASS INDEX: 25.74 KG/M2

## 2022-03-08 DIAGNOSIS — M25.372 ANKLE INSTABILITY, LEFT: Primary | ICD-10-CM

## 2022-03-08 PROCEDURE — 99212 OFFICE O/P EST SF 10 MIN: CPT | Performed by: PODIATRIST

## 2022-03-08 NOTE — PROGRESS NOTES
"03/08/2022  Foot and Ankle Surgery - Established Patient/Follow-up  Provider: Dr. Melvin Pineda DPM  Location: [facility]    Subjective:  Antonia Estrella is a 36 y.o. female.     Chief Complaint   Patient presents with   • Left Ankle - Pain     Last pcp appt with doug johnston md  11/2/2021       HPI: Patient returns approximately 14 weeks after reconstruction of her left ankle.  She states that she is doing much better overall.  She has been participating with therapy at home.  She has continued to notice improvements.  She continues to remain concerned about overall issues involving her body including pain and limitation to multiple joints.    Allergies   Allergen Reactions   • Adhesive Tape Rash     Liquid adhesive -        Current Outpatient Medications on File Prior to Visit   Medication Sig Dispense Refill   • Acetaminophen 325 MG capsule Take 650 mg by mouth As Needed.     • amphetamine-dextroamphetamine (ADDERALL) 20 MG tablet Take 1 tablet by mouth 3 (Three) Times a Day.     • Baclofen 2 % cream Apply 1 application topically to the appropriate area as directed Daily. dont use once bathing with chg     • diclofenac (VOLTAREN) 75 MG EC tablet Take 75 mg by mouth 2 (Two) Times a Day.     • HYDROcodone-acetaminophen (NORCO) 7.5-325 MG per tablet Take 1 tablet by mouth 4 (Four) Times a Day As Needed. May take preop     • Ibuprofen (ADVIL PO) Take 400 mg by mouth 2 (Two) Times a Day.     • ondansetron (ZOFRAN) 4 MG tablet Take 4 mg by mouth Daily As Needed. If needed may take preop     • potassium chloride 10 MEQ CR tablet      • varenicline (CHANTIX) 1 MG tablet Take 1 mg by mouth 2 (Two) Times a Day.       No current facility-administered medications on file prior to visit.       Objective   Ht 170.2 cm (67\")   Wt 74.4 kg (164 lb)   BMI 25.69 kg/m²     General:   Appearance: appears stated age and healthy    Orientation: AAOx3    Affect: appropriate    Gait: unimpaired       VASCULAR       Right " Foot Vascularity   Normal vascular exam    Dorsalis pedis:  2+  Posterior tibial:  2+  Skin Temperature: warm    Edema Grading:  None  CFT:  < 3 seconds  Pedal Hair Growth:  Present  Varicosities: none        Left Foot Vascularity   Normal vascular exam    Dorsalis pedis:  2+  Posterior tibial:  2+  Skin Temperature: warm    Edema Grading:  None  CFT:  < 3 seconds  Pedal Hair Growth:  Present  Varicosities: none        NEUROLOGIC      Right Foot Neurologic   Light touch sensation:  Normal  Hot/Cold sensation: normal    Achilles reflex:  2+      Left Foot Neurologic   Light touch sensation:  Normal  Hot/cold sensation: normal    Achilles reflex:  2+      MUSCULOSKELETAL       Right Foot Musculoskeletal   Ecchymosis:  None  Tenderness: none    Arch:  Normal      Left Foot Musculoskeletal   Ecchymosis: None  Tenderness: None  Arch:  Normal      MUSCLE STRENGTH      Right Foot Muscle Strength   Normal strength    Foot dorsiflexion:  5  Foot plantar flexion:  5  Foot inversion:  5  Foot eversion:  5      Left Foot Muscle Strength   Normal strength    Foot dorsiflexion:  5  Foot plantar flexion:  5  Foot inversion:  5  Foot eversion:  5      DERMATOLOGIC      Right Foot Dermatologic   Skin: skin intact        Left Foot Dermatologic   Skin: Incision sites are well-healed    No instability noted to the anterior lateral aspect of the ankle.  No prominent pain or swelling.  Assessment/Plan   Diagnoses and all orders for this visit:    1. Ankle instability, left (Primary)  -     Ambulatory Referral to Rheumatology      Patient has noticed significant improvement since last exam.  Clinically, she has no pain or swelling involving the ankle.  The range of motion has dramatically improved.  She has noticed 100% improvement as compared to preoperative assessment.  She feels that the ankle is much more stable.  I have discussed the importance of continued range of motion and strengthening exercises.  Patient may gradually return to  baseline activity as tolerated.  We did review proper supportive shoes and devices with increased activity.  Patient is quite concerned about her polyarthralgia and multiple issues that she has had in the past.  I do feel that she would be best to discuss issues with rheumatologist.  She understands and agrees.  Referral has been made to rheumatology.  Patient is to see me on an as-needed basis at this time    Orders Placed This Encounter   Procedures   • Ambulatory Referral to Rheumatology     Referral Priority:   Routine     Referral Type:   Consultation     Referral Reason:   Specialty Services Required     Requested Specialty:   Rheumatology     Number of Visits Requested:   1          Note is dictated utilizing voice recognition software. Unfortunately this leads to occasional typographical errors. I apologize in advance if the situation occurs. If questions occur please do not hesitate to call our office.

## 2022-10-25 ENCOUNTER — OFFICE (OUTPATIENT)
Dept: URBAN - METROPOLITAN AREA CLINIC 64 | Facility: CLINIC | Age: 37
End: 2022-10-25

## 2022-10-25 VITALS — HEIGHT: 67 IN | WEIGHT: 161 LBS

## 2022-10-25 DIAGNOSIS — R19.4 CHANGE IN BOWEL HABIT: ICD-10-CM

## 2022-10-25 DIAGNOSIS — R13.10 DYSPHAGIA, UNSPECIFIED: ICD-10-CM

## 2022-10-25 PROCEDURE — 99214 OFFICE O/P EST MOD 30 MIN: CPT | Performed by: NURSE PRACTITIONER

## 2022-12-13 ENCOUNTER — CONSULT (OUTPATIENT)
Dept: CARDIOLOGY | Facility: CLINIC | Age: 37
End: 2022-12-13

## 2022-12-13 VITALS
HEART RATE: 86 BPM | OXYGEN SATURATION: 100 % | RESPIRATION RATE: 18 BRPM | DIASTOLIC BLOOD PRESSURE: 86 MMHG | HEIGHT: 67 IN | BODY MASS INDEX: 24.96 KG/M2 | SYSTOLIC BLOOD PRESSURE: 133 MMHG | WEIGHT: 159 LBS

## 2022-12-13 DIAGNOSIS — R00.2 PALPITATIONS: ICD-10-CM

## 2022-12-13 DIAGNOSIS — E78.2 MIXED HYPERLIPIDEMIA: ICD-10-CM

## 2022-12-13 DIAGNOSIS — R07.9 CHEST PAIN, UNSPECIFIED TYPE: Primary | ICD-10-CM

## 2022-12-13 DIAGNOSIS — R06.02 SOB (SHORTNESS OF BREATH): ICD-10-CM

## 2022-12-13 PROCEDURE — 93000 ELECTROCARDIOGRAM COMPLETE: CPT | Performed by: INTERNAL MEDICINE

## 2022-12-13 PROCEDURE — 99203 OFFICE O/P NEW LOW 30 MIN: CPT | Performed by: INTERNAL MEDICINE

## 2022-12-13 RX ORDER — MELOXICAM 15 MG/1
15 TABLET ORAL DAILY
COMMUNITY
Start: 2022-09-23

## 2022-12-13 RX ORDER — ATORVASTATIN CALCIUM 10 MG/1
10 TABLET, FILM COATED ORAL DAILY
COMMUNITY

## 2022-12-13 RX ORDER — ERGOCALCIFEROL 1.25 MG/1
CAPSULE ORAL
COMMUNITY
Start: 2022-09-26 | End: 2022-12-13
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
## 2022-12-14 ENCOUNTER — OFFICE (OUTPATIENT)
Dept: URBAN - METROPOLITAN AREA PATHOLOGY 4 | Facility: PATHOLOGY | Age: 37
End: 2022-12-14

## 2022-12-14 ENCOUNTER — ON CAMPUS - OUTPATIENT (OUTPATIENT)
Dept: URBAN - METROPOLITAN AREA HOSPITAL 2 | Facility: HOSPITAL | Age: 37
End: 2022-12-14

## 2022-12-14 VITALS
HEART RATE: 85 BPM | HEART RATE: 101 BPM | HEART RATE: 89 BPM | HEART RATE: 88 BPM | SYSTOLIC BLOOD PRESSURE: 130 MMHG | DIASTOLIC BLOOD PRESSURE: 83 MMHG | SYSTOLIC BLOOD PRESSURE: 151 MMHG | HEIGHT: 67 IN | HEART RATE: 74 BPM | OXYGEN SATURATION: 99 % | DIASTOLIC BLOOD PRESSURE: 76 MMHG | DIASTOLIC BLOOD PRESSURE: 86 MMHG | SYSTOLIC BLOOD PRESSURE: 143 MMHG | HEART RATE: 93 BPM | SYSTOLIC BLOOD PRESSURE: 125 MMHG | DIASTOLIC BLOOD PRESSURE: 90 MMHG | DIASTOLIC BLOOD PRESSURE: 82 MMHG | HEART RATE: 86 BPM | DIASTOLIC BLOOD PRESSURE: 98 MMHG | SYSTOLIC BLOOD PRESSURE: 141 MMHG | SYSTOLIC BLOOD PRESSURE: 144 MMHG | RESPIRATION RATE: 17 BRPM | RESPIRATION RATE: 18 BRPM | HEART RATE: 96 BPM | OXYGEN SATURATION: 100 % | HEART RATE: 98 BPM | DIASTOLIC BLOOD PRESSURE: 91 MMHG | SYSTOLIC BLOOD PRESSURE: 127 MMHG | TEMPERATURE: 98 F | RESPIRATION RATE: 16 BRPM | SYSTOLIC BLOOD PRESSURE: 136 MMHG | DIASTOLIC BLOOD PRESSURE: 87 MMHG | WEIGHT: 157 LBS | SYSTOLIC BLOOD PRESSURE: 134 MMHG | SYSTOLIC BLOOD PRESSURE: 133 MMHG

## 2022-12-14 DIAGNOSIS — K44.9 DIAPHRAGMATIC HERNIA WITHOUT OBSTRUCTION OR GANGRENE: ICD-10-CM

## 2022-12-14 DIAGNOSIS — R11.2 NAUSEA WITH VOMITING, UNSPECIFIED: ICD-10-CM

## 2022-12-14 DIAGNOSIS — R13.10 DYSPHAGIA, UNSPECIFIED: ICD-10-CM

## 2022-12-14 DIAGNOSIS — K62.1 RECTAL POLYP: ICD-10-CM

## 2022-12-14 DIAGNOSIS — K62.5 HEMORRHAGE OF ANUS AND RECTUM: ICD-10-CM

## 2022-12-14 DIAGNOSIS — R19.4 CHANGE IN BOWEL HABIT: ICD-10-CM

## 2022-12-14 LAB
GI HISTOLOGY: A. SELECT: (no result)
GI HISTOLOGY: B. SELECT: (no result)
GI HISTOLOGY: C. UNSPECIFIED: (no result)
GI HISTOLOGY: PDF REPORT: (no result)

## 2022-12-14 PROCEDURE — 88305 TISSUE EXAM BY PATHOLOGIST: CPT | Performed by: INTERNAL MEDICINE

## 2022-12-14 PROCEDURE — 45385 COLONOSCOPY W/LESION REMOVAL: CPT | Performed by: INTERNAL MEDICINE

## 2022-12-14 PROCEDURE — 43450 DILATE ESOPHAGUS 1/MULT PASS: CPT | Performed by: INTERNAL MEDICINE

## 2022-12-14 PROCEDURE — 43239 EGD BIOPSY SINGLE/MULTIPLE: CPT | Performed by: INTERNAL MEDICINE

## 2022-12-14 RX ORDER — OMEPRAZOLE 40 MG/1
40 CAPSULE, DELAYED RELEASE ORAL
Qty: 30 | Refills: 11 | Status: ACTIVE
Start: 2022-12-14

## 2023-01-03 ENCOUNTER — APPOINTMENT (OUTPATIENT)
Dept: CARDIOLOGY | Facility: HOSPITAL | Age: 38
End: 2023-01-03
Payer: COMMERCIAL

## 2023-01-06 ENCOUNTER — OFFICE (OUTPATIENT)
Dept: URBAN - METROPOLITAN AREA CLINIC 64 | Facility: CLINIC | Age: 38
End: 2023-01-06

## 2023-01-06 VITALS
WEIGHT: 163 LBS | SYSTOLIC BLOOD PRESSURE: 101 MMHG | HEART RATE: 89 BPM | DIASTOLIC BLOOD PRESSURE: 71 MMHG | HEIGHT: 67 IN

## 2023-01-06 DIAGNOSIS — K62.5 HEMORRHAGE OF ANUS AND RECTUM: ICD-10-CM

## 2023-01-06 DIAGNOSIS — R19.4 CHANGE IN BOWEL HABIT: ICD-10-CM

## 2023-01-06 DIAGNOSIS — R11.2 NAUSEA WITH VOMITING, UNSPECIFIED: ICD-10-CM

## 2023-01-06 DIAGNOSIS — R13.10 DYSPHAGIA, UNSPECIFIED: ICD-10-CM

## 2023-01-06 DIAGNOSIS — R10.84 GENERALIZED ABDOMINAL PAIN: ICD-10-CM

## 2023-01-06 PROCEDURE — 99214 OFFICE O/P EST MOD 30 MIN: CPT | Performed by: NURSE PRACTITIONER

## (undated) DEVICE — SUT VIC 2/0 CT2 27IN J269H

## (undated) DEVICE — INTENDED FOR TISSUE SEPARATION, AND OTHER PROCEDURES THAT REQUIRE A SHARP SURGICAL BLADE TO PUNCTURE OR CUT.: Brand: BARD-PARKER ® CARBON RIB-BACK BLADES

## (undated) DEVICE — SOL IRR NACL 0.9PCT ARTHROMATIC 3000ML

## (undated) DEVICE — DRAPE,ORTHOMAX,ARTHRO T ,W/ POUCH: Brand: MEDLINE

## (undated) DEVICE — UNDYED BRAIDED (POLYGLACTIN 910), SYNTHETIC ABSORBABLE SUTURE: Brand: COATED VICRYL

## (undated) DEVICE — TBG ARTHSCP PUMP W CONN/REDUC 8FT

## (undated) DEVICE — NDL HYPO PRECISIONGLIDE/REG 18G 11/2 PNK

## (undated) DEVICE — SUT ETHLN 3/0 FS1 30IN 669H

## (undated) DEVICE — SOLUTION,WATER,IRRIGATION,1000ML,STERILE: Brand: MEDLINE

## (undated) DEVICE — PK EXTREM 50

## (undated) DEVICE — UNDERGLV SURG BIOGEL INDICAT PI SZ8 BLU

## (undated) DEVICE — SPNG GZ WOVN 4X4IN 12PLY 10/BX STRL

## (undated) DEVICE — TBG ARTHSCP PT W CONN/REDUC 8FT

## (undated) DEVICE — GOWN,REINFORCE,POLY,SIRUS,BREATH SLV,XLG: Brand: MEDLINE

## (undated) DEVICE — PAD CAST SYN PROTOUCH RL 4IN NS

## (undated) DEVICE — BANDAGE,GAUZE,BULKEE II,4.5"X4.1YD,STRL: Brand: MEDLINE

## (undated) DEVICE — CONTROL SYRINGE LUER-LOCK TIP: Brand: MONOJECT

## (undated) DEVICE — GLV SURG BIOGEL M LTX PF 7 1/2

## (undated) DEVICE — PAD UNDERCAST WYTEX 4IN 4YD LF STRL

## (undated) DEVICE — DRAPE,U/ SHT,SPLIT,PLAS,STERIL: Brand: MEDLINE

## (undated) DEVICE — TP SXN YANKR BULB STRL

## (undated) DEVICE — OCCLUSIVE GAUZE STRIP OVERWRAP,3% BISMUTH TRIBROMOPHENATE IN PETROLATUM BLEND: Brand: XEROFORM

## (undated) DEVICE — NDL HYPO PRECISIONGLIDE/REG 18G 1IN PNK

## (undated) DEVICE — APPL CHLORAPREP HI/LITE TINTED 10.5ML ORNG

## (undated) DEVICE — STRAP ANKL DISTR ARTH 1PT USE

## (undated) DEVICE — SPLNT 1 STEP 4INX30IN

## (undated) DEVICE — BLD DISSCT COOL CUT SJ 3MM 7CM

## (undated) DEVICE — BNDG ELAS MATRX V/CLS 4IN 5YD LF

## (undated) DEVICE — TUBING, SUCTION, 1/4" X 12', STRAIGHT: Brand: MEDLINE

## (undated) DEVICE — KT SURG TURNOVER 050

## (undated) DEVICE — TBG ARTHSCP DUALWAVE

## (undated) DEVICE — BNDG ESMARK 4IN 12FT LF STRL BLU

## (undated) DEVICE — CUFF TOURNI 1BLADDER 1PRT 30IN STRL